# Patient Record
Sex: FEMALE | Race: ASIAN | NOT HISPANIC OR LATINO | Employment: FULL TIME | URBAN - METROPOLITAN AREA
[De-identification: names, ages, dates, MRNs, and addresses within clinical notes are randomized per-mention and may not be internally consistent; named-entity substitution may affect disease eponyms.]

---

## 2023-02-21 LAB
ESTRADIOL (PG/ML) IN SER/PLAS: 118 PG/ML
LUTEINIZING HORMONE (IU/ML) IN SER/PLAS: 5.3 IU/L

## 2023-05-11 LAB — THYROTROPIN (MIU/L) IN SER/PLAS BY DETECTION LIMIT <= 0.05 MIU/L: 3.53 MIU/L (ref 0.44–3.98)

## 2023-05-12 LAB
ABO GROUP (TYPE) IN BLOOD: NORMAL
ANTIBODY SCREEN: NORMAL
CHLAMYDIA TRACH., AMPLIFIED: NEGATIVE
DEHYDROEPIANDROSTERONE SULFATE (DHEA-S) (UG/DL) IN SER/: 513 UG/DL (ref 12–379)
HEPATITIS B VIRUS SURFACE AG PRESENCE IN SERUM: NONREACTIVE
HEPATITIS C VIRUS AB PRESENCE IN SERUM: NONREACTIVE
HIV 1/ 2 AG/AB SCREEN: NONREACTIVE
N. GONORRHEA, AMPLIFIED: NEGATIVE
RH FACTOR: NORMAL
SYPHILIS TOTAL AB: NONREACTIVE

## 2023-06-20 LAB
ESTRADIOL (PG/ML) IN SER/PLAS: <20 PG/ML
HEMATOCRIT (%) IN BLOOD BY AUTOMATED COUNT: 35.8 % (ref 36–46)
THYROTROPIN (MIU/L) IN SER/PLAS BY DETECTION LIMIT <= 0.05 MIU/L: 9.18 MIU/L (ref 0.44–3.98)
THYROXINE (T4) FREE (NG/DL) IN SER/PLAS: 0.74 NG/DL (ref 0.61–1.12)

## 2023-06-26 LAB — ESTRADIOL (PG/ML) IN SER/PLAS: 228 PG/ML

## 2023-06-28 LAB — ESTRADIOL (PG/ML) IN SER/PLAS: 774 PG/ML

## 2023-06-30 LAB — ESTRADIOL (PG/ML) IN SER/PLAS: 1567 PG/ML

## 2023-07-02 LAB
ESTRADIOL (PG/ML) IN SER/PLAS: 3484 PG/ML
PROGESTERONE (NG/ML) IN SER/PLAS: 1.4 NG/ML

## 2023-07-03 LAB
ESTRADIOL (PG/ML) IN SER/PLAS: 4337 PG/ML
PROGESTERONE (NG/ML) IN SER/PLAS: 1.6 NG/ML

## 2023-07-04 LAB
LUTEINIZING HORMONE (IU/ML) IN SER/PLAS: 21.7 IU/L
PROGESTERONE (NG/ML) IN SER/PLAS: 11.8 NG/ML

## 2023-07-20 LAB — THYROTROPIN (MIU/L) IN SER/PLAS BY DETECTION LIMIT <= 0.05 MIU/L: 3.34 MIU/L (ref 0.44–3.98)

## 2023-08-07 LAB
ESTRADIOL (PG/ML) IN SER/PLAS: 418 PG/ML
PROGESTERONE (NG/ML) IN SER/PLAS: 0.4 NG/ML

## 2023-08-15 LAB — PROGESTERONE (NG/ML) IN SER/PLAS: 39.5 NG/ML

## 2023-08-25 LAB
CHORIOGONADOTROPIN (MIU/ML) IN SER/PLAS: 649 MIU/ML
THYROTROPIN (MIU/L) IN SER/PLAS BY DETECTION LIMIT <= 0.05 MIU/L: 4.72 MIU/L (ref 0.44–3.98)
THYROXINE (T4) FREE (NG/DL) IN SER/PLAS: 0.98 NG/DL (ref 0.61–1.12)

## 2023-09-01 LAB
CHORIOGONADOTROPIN (MIU/ML) IN SER/PLAS: ABNORMAL MIU/ML
THYROTROPIN (MIU/L) IN SER/PLAS BY DETECTION LIMIT <= 0.05 MIU/L: 3.45 MIU/L (ref 0.44–3.98)

## 2023-09-22 LAB
ALANINE AMINOTRANSFERASE (SGPT) (U/L) IN SER/PLAS: 14 U/L (ref 7–45)
ALBUMIN (G/DL) IN SER/PLAS: 3.9 G/DL (ref 3.4–5)
ALKALINE PHOSPHATASE (U/L) IN SER/PLAS: 58 U/L (ref 33–110)
ANION GAP IN SER/PLAS: 11 MMOL/L (ref 10–20)
ASPARTATE AMINOTRANSFERASE (SGOT) (U/L) IN SER/PLAS: 17 U/L (ref 9–39)
BILIRUBIN TOTAL (MG/DL) IN SER/PLAS: 0.3 MG/DL (ref 0–1.2)
CALCIUM (MG/DL) IN SER/PLAS: 9.6 MG/DL (ref 8.6–10.3)
CARBON DIOXIDE, TOTAL (MMOL/L) IN SER/PLAS: 26 MMOL/L (ref 21–32)
CHLORIDE (MMOL/L) IN SER/PLAS: 102 MMOL/L (ref 98–107)
CREATININE (MG/DL) IN SER/PLAS: 0.64 MG/DL (ref 0.5–1.05)
ERYTHROCYTE DISTRIBUTION WIDTH (RATIO) BY AUTOMATED COUNT: 12.6 % (ref 11.5–14.5)
ERYTHROCYTE MEAN CORPUSCULAR HEMOGLOBIN CONCENTRATION (G/DL) BY AUTOMATED: 32.6 G/DL (ref 32–36)
ERYTHROCYTE MEAN CORPUSCULAR VOLUME (FL) BY AUTOMATED COUNT: 91 FL (ref 80–100)
ERYTHROCYTES (10*6/UL) IN BLOOD BY AUTOMATED COUNT: 4.33 X10E12/L (ref 4–5.2)
GFR FEMALE: >90 ML/MIN/1.73M2
GLUCOSE (MG/DL) IN SER/PLAS: 91 MG/DL (ref 74–99)
HEMATOCRIT (%) IN BLOOD BY AUTOMATED COUNT: 39.3 % (ref 36–46)
HEMOGLOBIN (G/DL) IN BLOOD: 12.8 G/DL (ref 12–16)
LEUKOCYTES (10*3/UL) IN BLOOD BY AUTOMATED COUNT: 12.3 X10E9/L (ref 4.4–11.3)
NRBC (PER 100 WBCS) BY AUTOMATED COUNT: 0 /100 WBC (ref 0–0)
PLATELETS (10*3/UL) IN BLOOD AUTOMATED COUNT: 270 X10E9/L (ref 150–450)
POTASSIUM (MMOL/L) IN SER/PLAS: 3.8 MMOL/L (ref 3.5–5.3)
PROTEIN TOTAL: 7.3 G/DL (ref 6.4–8.2)
REFLEX ADDED, ANEMIA PANEL: ABNORMAL
SODIUM (MMOL/L) IN SER/PLAS: 135 MMOL/L (ref 136–145)
THYROTROPIN (MIU/L) IN SER/PLAS BY DETECTION LIMIT <= 0.05 MIU/L: 0.81 MIU/L (ref 0.44–3.98)
UREA NITROGEN (MG/DL) IN SER/PLAS: 10 MG/DL (ref 6–23)

## 2023-09-23 LAB
ABO GROUP (TYPE) IN BLOOD: NORMAL
ANTIBODY SCREEN: NORMAL
CALCIDIOL (25 OH VITAMIN D3) (NG/ML) IN SER/PLAS: 61 NG/ML
CHLAMYDIA TRACH., AMPLIFIED: NEGATIVE
ESTIMATED AVERAGE GLUCOSE FOR HBA1C: 100 MG/DL
HEMOGLOBIN A1C/HEMOGLOBIN TOTAL IN BLOOD: 5.1 %
HEPATITIS B VIRUS SURFACE AG PRESENCE IN SERUM: NONREACTIVE
HEPATITIS C VIRUS AB PRESENCE IN SERUM: NONREACTIVE
HIV 1/ 2 AG/AB SCREEN: NONREACTIVE
N. GONORRHEA, AMPLIFIED: NEGATIVE
RH FACTOR: NORMAL
RUBELLA VIRUS IGG AB: POSITIVE
SYPHILIS TOTAL AB: NONREACTIVE
TRICHOMONAS VAGINALIS: NEGATIVE

## 2023-09-24 LAB — URINE CULTURE: NORMAL

## 2023-09-27 LAB
HEMOGLOBIN A2: 2.6 %
HEMOGLOBIN A: 97.1 %
HEMOGLOBIN F: 0.3 %
HEMOGLOBIN IDENTIFICATION INTERPRETATION: NORMAL
PATH REVIEW-HGB IDENTIFICATION: NORMAL

## 2023-10-08 PROBLEM — M25.611 STIFFNESS OF RIGHT SHOULDER JOINT: Status: ACTIVE | Noted: 2023-10-08

## 2023-10-08 PROBLEM — N91.5 OLIGOMENORRHEA: Status: ACTIVE | Noted: 2023-10-08

## 2023-10-08 PROBLEM — M25.511 PAIN OF RIGHT SHOULDER REGION: Status: ACTIVE | Noted: 2023-10-08

## 2023-10-08 PROBLEM — Z86.19 HISTORY OF HEPATITIS B VIRUS INFECTION: Status: ACTIVE | Noted: 2023-10-08

## 2023-10-08 PROBLEM — E28.2 PCOS (POLYCYSTIC OVARIAN SYNDROME): Status: ACTIVE | Noted: 2023-10-08

## 2023-10-08 PROBLEM — R79.89 LOW VITAMIN D LEVEL: Status: ACTIVE | Noted: 2023-10-08

## 2023-10-08 PROBLEM — O36.80X0 ENCOUNTER TO DETERMINE FETAL VIABILITY OF PREGNANCY (HHS-HCC): Status: ACTIVE | Noted: 2023-10-08

## 2023-10-08 PROBLEM — N97.9 FEMALE INFERTILITY: Status: ACTIVE | Noted: 2023-10-08

## 2023-10-08 PROBLEM — E03.8 SUBCLINICAL HYPOTHYROIDISM: Status: ACTIVE | Noted: 2023-10-08

## 2023-10-08 PROBLEM — N92.6 IRREGULAR PERIODS: Status: ACTIVE | Noted: 2023-10-08

## 2023-10-08 PROBLEM — R29.898 WEAKNESS OF SHOULDER: Status: ACTIVE | Noted: 2023-10-08

## 2023-10-08 PROBLEM — F41.9 ANXIETY: Status: ACTIVE | Noted: 2023-10-08

## 2023-10-08 PROBLEM — S43.431D LABRAL TEAR OF SHOULDER, RIGHT, SUBSEQUENT ENCOUNTER: Status: ACTIVE | Noted: 2023-10-08

## 2023-10-08 PROBLEM — R79.89 ELEVATED DEHYDROEPIANDROSTERONE SULFATE LEVEL: Status: ACTIVE | Noted: 2023-10-08

## 2023-10-08 RX ORDER — CHORIOGONADOTROPIN ALFA 250 UG/.5ML
INJECTION, SOLUTION SUBCUTANEOUS
COMMUNITY
Start: 2022-12-07 | End: 2023-10-10 | Stop reason: ALTCHOICE

## 2023-10-08 RX ORDER — FOLLITROPIN 450 [IU]/.75ML
INJECTION, SOLUTION SUBCUTANEOUS
COMMUNITY
Start: 2023-06-07 | End: 2023-10-10 | Stop reason: ALTCHOICE

## 2023-10-08 RX ORDER — GONADOTROPHIN, CHORIONIC 5000 UNIT
KIT INTRAMUSCULAR
COMMUNITY
Start: 2023-06-13 | End: 2023-10-10 | Stop reason: ALTCHOICE

## 2023-10-08 RX ORDER — LEUPROLIDE ACETATE 1 MG/0.2ML
KIT SUBCUTANEOUS
COMMUNITY
Start: 2023-06-07 | End: 2023-10-10 | Stop reason: ALTCHOICE

## 2023-10-08 RX ORDER — CETRORELIX ACETATE 0.25 MG
KIT SUBCUTANEOUS
COMMUNITY
Start: 2023-06-13 | End: 2023-10-10 | Stop reason: ALTCHOICE

## 2023-10-08 RX ORDER — ACETAMINOPHEN 500 MG
1 TABLET ORAL DAILY
COMMUNITY
Start: 2022-02-07

## 2023-10-08 RX ORDER — LETROZOLE 2.5 MG/1
TABLET, FILM COATED ORAL
COMMUNITY
Start: 2022-07-20 | End: 2023-10-10 | Stop reason: ALTCHOICE

## 2023-10-08 RX ORDER — GANIRELIX ACETATE 250 UG/.5ML
INJECTION, SOLUTION SUBCUTANEOUS
COMMUNITY
Start: 2023-06-07 | End: 2023-10-10 | Stop reason: ALTCHOICE

## 2023-10-08 RX ORDER — LEVOTHYROXINE SODIUM 25 UG/1
1 TABLET ORAL DAILY
COMMUNITY
Start: 2023-05-12 | End: 2023-10-10 | Stop reason: ALTCHOICE

## 2023-10-08 RX ORDER — NORGESTIMATE AND ETHINYL ESTRADIOL 0.25-0.035
1 KIT ORAL DAILY
COMMUNITY
Start: 2023-06-05 | End: 2023-10-10 | Stop reason: ALTCHOICE

## 2023-10-08 RX ORDER — LEVOTHYROXINE SODIUM 75 UG/1
1 TABLET ORAL DAILY
COMMUNITY
Start: 2023-06-20 | End: 2023-10-12

## 2023-10-08 RX ORDER — MENOTROPINS 75 UNIT
KIT SUBCUTANEOUS
COMMUNITY
Start: 2023-06-07 | End: 2023-10-10 | Stop reason: ALTCHOICE

## 2023-10-08 RX ORDER — ESTRADIOL 2 MG/1
3 TABLET ORAL DAILY
COMMUNITY
Start: 2023-07-20 | End: 2023-10-10 | Stop reason: ALTCHOICE

## 2023-10-10 ENCOUNTER — ROUTINE PRENATAL (OUTPATIENT)
Dept: OBSTETRICS AND GYNECOLOGY | Facility: CLINIC | Age: 33
End: 2023-10-10
Payer: COMMERCIAL

## 2023-10-10 VITALS
DIASTOLIC BLOOD PRESSURE: 82 MMHG | BODY MASS INDEX: 24.99 KG/M2 | WEIGHT: 154.38 LBS | SYSTOLIC BLOOD PRESSURE: 124 MMHG

## 2023-10-10 DIAGNOSIS — Z3A.11 11 WEEKS GESTATION OF PREGNANCY (HHS-HCC): Primary | ICD-10-CM

## 2023-10-10 DIAGNOSIS — E03.9 HYPOTHYROIDISM, UNSPECIFIED TYPE: ICD-10-CM

## 2023-10-10 PROBLEM — R29.898 WEAKNESS OF SHOULDER: Status: RESOLVED | Noted: 2023-10-08 | Resolved: 2023-10-10

## 2023-10-10 PROBLEM — M25.511 PAIN OF RIGHT SHOULDER REGION: Status: RESOLVED | Noted: 2023-10-08 | Resolved: 2023-10-10

## 2023-10-10 PROBLEM — M25.611 STIFFNESS OF RIGHT SHOULDER JOINT: Status: RESOLVED | Noted: 2023-10-08 | Resolved: 2023-10-10

## 2023-10-10 PROBLEM — R79.89 LOW VITAMIN D LEVEL: Status: RESOLVED | Noted: 2023-10-08 | Resolved: 2023-10-10

## 2023-10-10 PROBLEM — R79.89 ELEVATED DEHYDROEPIANDROSTERONE SULFATE LEVEL: Status: RESOLVED | Noted: 2023-10-08 | Resolved: 2023-10-10

## 2023-10-10 PROBLEM — S43.431D LABRAL TEAR OF SHOULDER, RIGHT, SUBSEQUENT ENCOUNTER: Status: RESOLVED | Noted: 2023-10-08 | Resolved: 2023-10-10

## 2023-10-10 PROBLEM — E28.2 PCOS (POLYCYSTIC OVARIAN SYNDROME): Status: RESOLVED | Noted: 2023-10-08 | Resolved: 2023-10-10

## 2023-10-10 PROBLEM — N92.6 IRREGULAR PERIODS: Status: RESOLVED | Noted: 2023-10-08 | Resolved: 2023-10-10

## 2023-10-10 PROBLEM — N91.5 OLIGOMENORRHEA: Status: RESOLVED | Noted: 2023-10-08 | Resolved: 2023-10-10

## 2023-10-10 PROBLEM — Z34.01 ENCOUNTER FOR SUPERVISION OF NORMAL FIRST PREGNANCY IN FIRST TRIMESTER (HHS-HCC): Status: ACTIVE | Noted: 2023-10-10

## 2023-10-10 PROBLEM — F41.9 ANXIETY: Status: RESOLVED | Noted: 2023-10-08 | Resolved: 2023-10-10

## 2023-10-10 PROBLEM — O36.80X0 ENCOUNTER TO DETERMINE FETAL VIABILITY OF PREGNANCY (HHS-HCC): Status: RESOLVED | Noted: 2023-10-08 | Resolved: 2023-10-10

## 2023-10-10 PROCEDURE — 36415 COLL VENOUS BLD VENIPUNCTURE: CPT

## 2023-10-10 PROCEDURE — 0501F PRENATAL FLOW SHEET: CPT | Performed by: OBSTETRICS & GYNECOLOGY

## 2023-10-10 PROCEDURE — 84443 ASSAY THYROID STIM HORMONE: CPT

## 2023-10-10 RX ORDER — ONDANSETRON 4 MG/1
4 TABLET, FILM COATED ORAL EVERY 6 HOURS PRN
COMMUNITY
Start: 2023-09-22 | End: 2024-01-05 | Stop reason: WASHOUT

## 2023-10-10 NOTE — PROGRESS NOTES
Routine ob at 11.2 wks by IVF dates.  Feeling well, starting to feel better.  Will increase her ASA from 81 mg to 162 mg at 12 wks.  S/p flu vaccine and new ob visit, will get COVID booster at pharmacy.  NIPS and repeat TSH drawn today.  Has NT scan scheduled.  RTC 4 wks.

## 2023-10-11 LAB — TSH SERPL-ACNC: 0.3 MIU/L (ref 0.44–3.98)

## 2023-10-12 DIAGNOSIS — E03.9 HYPOTHYROIDISM AFFECTING PREGNANCY IN FIRST TRIMESTER (HHS-HCC): Primary | ICD-10-CM

## 2023-10-12 DIAGNOSIS — O99.281 HYPOTHYROIDISM AFFECTING PREGNANCY IN FIRST TRIMESTER (HHS-HCC): Primary | ICD-10-CM

## 2023-10-12 RX ORDER — LEVOTHYROXINE SODIUM 50 UG/1
50 TABLET ORAL
Qty: 30 TABLET | Refills: 11 | Status: SHIPPED | OUTPATIENT
Start: 2023-10-12 | End: 2024-10-11

## 2023-10-13 ENCOUNTER — TELEPHONE (OUTPATIENT)
Dept: OBSTETRICS AND GYNECOLOGY | Facility: CLINIC | Age: 33
End: 2023-10-13
Payer: COMMERCIAL

## 2023-10-19 ENCOUNTER — APPOINTMENT (OUTPATIENT)
Dept: OBSTETRICS AND GYNECOLOGY | Facility: CLINIC | Age: 33
End: 2023-10-19
Payer: COMMERCIAL

## 2023-10-23 ENCOUNTER — PATIENT MESSAGE (OUTPATIENT)
Dept: OBSTETRICS AND GYNECOLOGY | Facility: CLINIC | Age: 33
End: 2023-10-23
Payer: COMMERCIAL

## 2023-10-23 ENCOUNTER — ANCILLARY PROCEDURE (OUTPATIENT)
Dept: RADIOLOGY | Facility: CLINIC | Age: 33
End: 2023-10-23
Payer: COMMERCIAL

## 2023-10-23 DIAGNOSIS — Z34.90 ENCOUNTER FOR SUPERVISION OF NORMAL PREGNANCY, UNSPECIFIED, UNSPECIFIED TRIMESTER (HHS-HCC): ICD-10-CM

## 2023-10-23 DIAGNOSIS — O09.01: ICD-10-CM

## 2023-10-23 PROCEDURE — 76816 OB US FOLLOW-UP PER FETUS: CPT

## 2023-10-23 PROCEDURE — 76813 OB US NUCHAL MEAS 1 GEST: CPT

## 2023-10-23 PROCEDURE — 76813 OB US NUCHAL MEAS 1 GEST: CPT | Performed by: OBSTETRICS & GYNECOLOGY

## 2023-11-06 ENCOUNTER — APPOINTMENT (OUTPATIENT)
Dept: OBSTETRICS AND GYNECOLOGY | Facility: CLINIC | Age: 33
End: 2023-11-06
Payer: COMMERCIAL

## 2023-11-07 ENCOUNTER — ROUTINE PRENATAL (OUTPATIENT)
Dept: OBSTETRICS AND GYNECOLOGY | Facility: CLINIC | Age: 33
End: 2023-11-07
Payer: COMMERCIAL

## 2023-11-07 VITALS — WEIGHT: 151.5 LBS | DIASTOLIC BLOOD PRESSURE: 78 MMHG | SYSTOLIC BLOOD PRESSURE: 118 MMHG | BODY MASS INDEX: 24.52 KG/M2

## 2023-11-07 DIAGNOSIS — E03.9 HYPOTHYROIDISM, UNSPECIFIED TYPE: Primary | ICD-10-CM

## 2023-11-07 DIAGNOSIS — Z3A.14 14 WEEKS GESTATION OF PREGNANCY (HHS-HCC): ICD-10-CM

## 2023-11-07 PROCEDURE — 0501F PRENATAL FLOW SHEET: CPT | Performed by: OBSTETRICS & GYNECOLOGY

## 2023-11-07 NOTE — PROGRESS NOTES
No complaints.  14w5d IVF preg  Wants to meet all the docs in group.    Thyroid med dose recently adjusted lower. Recheck TSH.    OB labs reviewed w her in detail. All wnl. NIPT wnl  XY . NT scan wnl.     Has linda US sched.  Noted minimal wt gain, ? Loss 3 lb on different office scale .  pt will try increase calories.    Answered her questions on midwifery care and  care.  She will look into this more.     Follow up in 4 weeks   Makenna Parkinson MD

## 2023-11-14 ENCOUNTER — LAB (OUTPATIENT)
Dept: LAB | Facility: LAB | Age: 33
End: 2023-11-14
Payer: COMMERCIAL

## 2023-11-14 DIAGNOSIS — E03.9 HYPOTHYROIDISM, UNSPECIFIED TYPE: ICD-10-CM

## 2023-11-14 LAB — TSH SERPL-ACNC: 0.61 MIU/L (ref 0.44–3.98)

## 2023-11-14 PROCEDURE — 84443 ASSAY THYROID STIM HORMONE: CPT

## 2023-11-14 PROCEDURE — 36415 COLL VENOUS BLD VENIPUNCTURE: CPT

## 2023-12-05 ENCOUNTER — ROUTINE PRENATAL (OUTPATIENT)
Dept: OBSTETRICS AND GYNECOLOGY | Facility: CLINIC | Age: 33
End: 2023-12-05
Payer: COMMERCIAL

## 2023-12-05 VITALS
WEIGHT: 158.13 LBS | SYSTOLIC BLOOD PRESSURE: 118 MMHG | DIASTOLIC BLOOD PRESSURE: 78 MMHG | BODY MASS INDEX: 25.59 KG/M2

## 2023-12-05 DIAGNOSIS — E03.8 SUBCLINICAL HYPOTHYROIDISM: Primary | ICD-10-CM

## 2023-12-05 DIAGNOSIS — Z34.01 ENCOUNTER FOR SUPERVISION OF NORMAL FIRST PREGNANCY IN FIRST TRIMESTER (HHS-HCC): ICD-10-CM

## 2023-12-05 DIAGNOSIS — Z86.19 HISTORY OF HEPATITIS B VIRUS INFECTION: ICD-10-CM

## 2023-12-05 DIAGNOSIS — Z3A.18 18 WEEKS GESTATION OF PREGNANCY (HHS-HCC): ICD-10-CM

## 2023-12-05 PROBLEM — Z3A.11 11 WEEKS GESTATION OF PREGNANCY (HHS-HCC): Status: RESOLVED | Noted: 2023-10-10 | Resolved: 2023-12-05

## 2023-12-05 PROCEDURE — 0501F PRENATAL FLOW SHEET: CPT | Performed by: STUDENT IN AN ORGANIZED HEALTH CARE EDUCATION/TRAINING PROGRAM

## 2023-12-05 NOTE — PROGRESS NOTES
Subjective   Patient ID [id number]   Marisabel Segundo is a 33 y.o.  at 18w5d with a working estimated date of delivery of 2024, by Embryo Transfer who presents for a routine prenatal visit. Nausea resolved. Some cramping, no bleeding. Having some insomnia. Occasional cramps in calves.     Objective   Physical Exam  Vitals:    23 0832   BP: 118/78      Weight: 71.7 kg (158 lb 2 oz), Pregravid BMI: 24.60  Expected Total Weight Gain: 11.5 kg (25 lb)-16 kg (35 lb)   Fundal height and FHR per prenatal flowsheet    Assessment/Plan     Marisabel Segundo is a 33 y.o.  at 18w5d with a working estimated date of delivery of 2024, by Embryo Transfer who presents for a routine prenatal visit.    Reminded patient to get COVID booster, planning. Anatomy scheduled this week. Taking ASA and synthroid. To recheck TSH with second trimester labs. Encouraged GI follow-up for hepatitis B check-in. Reviewed growth scans for IVF. Remainder of plan per problem list as below.     Problem List Items Addressed This Visit       Encounter for supervision of normal first pregnancy in first trimester    Overview     -ASA at 12 wks  -s/p risk-reducing cfDNA and normal NT  -Rh positive, rubella immune  -s/p flu shot for this year 2023    Next Visit  [ ] follow-up COVID (planning)  [ ] GI follow-up         History of hepatitis B virus infection    Overview     -Last saw GI 2022, suspect cleared infection  -LFTs and surface Ag WNL this pregnancy         Subclinical hypothyroidism - Primary    Overview     -TSH 0.3 (10/10) -> synthroid decreased from 75mcg to 50mcg -> TSH 0.61 ()  [ ] Repeat TSH with second trimester labs          Other Visit Diagnoses       18 weeks gestation of pregnancy              Follow up in 4 weeks for a routine prenatal visit.    Lottie Mcgrath MD

## 2023-12-07 ENCOUNTER — ANCILLARY PROCEDURE (OUTPATIENT)
Dept: RADIOLOGY | Facility: CLINIC | Age: 33
End: 2023-12-07
Payer: COMMERCIAL

## 2023-12-07 DIAGNOSIS — Z34.90 ENCOUNTER FOR SUPERVISION OF NORMAL PREGNANCY, UNSPECIFIED, UNSPECIFIED TRIMESTER (HHS-HCC): ICD-10-CM

## 2023-12-07 PROCEDURE — 76811 OB US DETAILED SNGL FETUS: CPT | Performed by: OBSTETRICS & GYNECOLOGY

## 2023-12-07 PROCEDURE — 76811 OB US DETAILED SNGL FETUS: CPT

## 2023-12-21 ENCOUNTER — ANCILLARY PROCEDURE (OUTPATIENT)
Dept: RADIOLOGY | Facility: CLINIC | Age: 33
End: 2023-12-21
Payer: COMMERCIAL

## 2023-12-21 DIAGNOSIS — Z34.90 ENCOUNTER FOR SUPERVISION OF NORMAL PREGNANCY, UNSPECIFIED, UNSPECIFIED TRIMESTER (HHS-HCC): ICD-10-CM

## 2023-12-21 PROCEDURE — 76816 OB US FOLLOW-UP PER FETUS: CPT

## 2023-12-21 PROCEDURE — 76816 OB US FOLLOW-UP PER FETUS: CPT | Performed by: OBSTETRICS & GYNECOLOGY

## 2023-12-29 ASSESSMENT — PROMIS GLOBAL HEALTH SCALE
RATE_GENERAL_HEALTH: VERY GOOD
CARRYOUT_PHYSICAL_ACTIVITIES: COMPLETELY
RATE_MENTAL_HEALTH: VERY GOOD
RATE_SOCIAL_SATISFACTION: VERY GOOD
RATE_AVERAGE_PAIN: 4
EMOTIONAL_PROBLEMS: RARELY
RATE_AVERAGE_FATIGUE: MODERATE
CARRYOUT_SOCIAL_ACTIVITIES: VERY GOOD
RATE_PHYSICAL_HEALTH: GOOD
RATE_QUALITY_OF_LIFE: VERY GOOD

## 2024-01-05 ENCOUNTER — LAB (OUTPATIENT)
Dept: LAB | Facility: LAB | Age: 34
End: 2024-01-05
Payer: COMMERCIAL

## 2024-01-05 ENCOUNTER — ROUTINE PRENATAL (OUTPATIENT)
Dept: OBSTETRICS AND GYNECOLOGY | Facility: CLINIC | Age: 34
End: 2024-01-05
Payer: COMMERCIAL

## 2024-01-05 ENCOUNTER — OFFICE VISIT (OUTPATIENT)
Dept: PRIMARY CARE | Facility: CLINIC | Age: 34
End: 2024-01-05
Payer: COMMERCIAL

## 2024-01-05 VITALS
DIASTOLIC BLOOD PRESSURE: 60 MMHG | OXYGEN SATURATION: 98 % | BODY MASS INDEX: 26.36 KG/M2 | HEIGHT: 66 IN | WEIGHT: 164 LBS | SYSTOLIC BLOOD PRESSURE: 100 MMHG | HEART RATE: 60 BPM | RESPIRATION RATE: 14 BRPM | TEMPERATURE: 97.6 F

## 2024-01-05 VITALS
WEIGHT: 166.38 LBS | BODY MASS INDEX: 26.93 KG/M2 | DIASTOLIC BLOOD PRESSURE: 70 MMHG | SYSTOLIC BLOOD PRESSURE: 108 MMHG

## 2024-01-05 DIAGNOSIS — Z86.19 HISTORY OF HEPATITIS B VIRUS INFECTION: ICD-10-CM

## 2024-01-05 DIAGNOSIS — Z00.00 PERIODIC HEALTH ASSESSMENT, GENERAL SCREENING, ADULT: ICD-10-CM

## 2024-01-05 DIAGNOSIS — M25.511 CHRONIC RIGHT SHOULDER PAIN: ICD-10-CM

## 2024-01-05 DIAGNOSIS — G89.29 CHRONIC RIGHT SHOULDER PAIN: ICD-10-CM

## 2024-01-05 DIAGNOSIS — E03.8 SUBCLINICAL HYPOTHYROIDISM: Primary | ICD-10-CM

## 2024-01-05 DIAGNOSIS — B18.1 CHRONIC HEPATITIS B VIRUS INFECTION (MULTI): ICD-10-CM

## 2024-01-05 DIAGNOSIS — Z34.01 ENCOUNTER FOR SUPERVISION OF NORMAL FIRST PREGNANCY IN FIRST TRIMESTER (HHS-HCC): ICD-10-CM

## 2024-01-05 DIAGNOSIS — Z3A.23 23 WEEKS GESTATION OF PREGNANCY (HHS-HCC): ICD-10-CM

## 2024-01-05 DIAGNOSIS — Z00.00 PERIODIC HEALTH ASSESSMENT, GENERAL SCREENING, ADULT: Primary | ICD-10-CM

## 2024-01-05 DIAGNOSIS — E03.9 ACQUIRED HYPOTHYROIDISM: ICD-10-CM

## 2024-01-05 PROBLEM — R87.610 ASCUS OF CERVIX WITH NEGATIVE HIGH RISK HPV: Status: ACTIVE | Noted: 2024-01-05

## 2024-01-05 LAB
ALBUMIN SERPL BCP-MCNC: 3.5 G/DL (ref 3.4–5)
ALP SERPL-CCNC: 54 U/L (ref 33–110)
ALT SERPL W P-5'-P-CCNC: 13 U/L (ref 7–45)
ANION GAP SERPL CALC-SCNC: 12 MMOL/L (ref 10–20)
AST SERPL W P-5'-P-CCNC: 14 U/L (ref 9–39)
BILIRUB SERPL-MCNC: 0.3 MG/DL (ref 0–1.2)
BUN SERPL-MCNC: 5 MG/DL (ref 6–23)
CALCIUM SERPL-MCNC: 8.9 MG/DL (ref 8.6–10.3)
CHLORIDE SERPL-SCNC: 104 MMOL/L (ref 98–107)
CHOLEST SERPL-MCNC: 195 MG/DL (ref 0–199)
CHOLESTEROL/HDL RATIO: 3.1
CO2 SERPL-SCNC: 25 MMOL/L (ref 21–32)
CREAT SERPL-MCNC: 0.5 MG/DL (ref 0.5–1.05)
ERYTHROCYTE [DISTWIDTH] IN BLOOD BY AUTOMATED COUNT: 13.7 % (ref 11.5–14.5)
GFR SERPL CREATININE-BSD FRML MDRD: >90 ML/MIN/1.73M*2
GLUCOSE SERPL-MCNC: 109 MG/DL (ref 74–99)
HBV CORE AB SER QL: REACTIVE
HBV SURFACE AB SER-ACNC: 5.3 MIU/ML
HBV SURFACE AG SERPL QL IA: NONREACTIVE
HCT VFR BLD AUTO: 36.5 % (ref 36–46)
HDLC SERPL-MCNC: 63.7 MG/DL
HGB BLD-MCNC: 12 G/DL (ref 12–16)
LDLC SERPL CALC-MCNC: 65 MG/DL
MCH RBC QN AUTO: 30.6 PG (ref 26–34)
MCHC RBC AUTO-ENTMCNC: 32.9 G/DL (ref 32–36)
MCV RBC AUTO: 93 FL (ref 80–100)
NON HDL CHOLESTEROL: 131 MG/DL (ref 0–149)
NRBC BLD-RTO: 0 /100 WBCS (ref 0–0)
PLATELET # BLD AUTO: 207 X10*3/UL (ref 150–450)
POTASSIUM SERPL-SCNC: 3.5 MMOL/L (ref 3.5–5.3)
PROT SERPL-MCNC: 6.3 G/DL (ref 6.4–8.2)
RBC # BLD AUTO: 3.92 X10*6/UL (ref 4–5.2)
SODIUM SERPL-SCNC: 137 MMOL/L (ref 136–145)
TRIGL SERPL-MCNC: 332 MG/DL (ref 0–149)
TSH SERPL-ACNC: 1.01 MIU/L (ref 0.44–3.98)
VLDL: 66 MG/DL (ref 0–40)
WBC # BLD AUTO: 12.8 X10*3/UL (ref 4.4–11.3)

## 2024-01-05 PROCEDURE — 84443 ASSAY THYROID STIM HORMONE: CPT

## 2024-01-05 PROCEDURE — 36415 COLL VENOUS BLD VENIPUNCTURE: CPT

## 2024-01-05 PROCEDURE — 99395 PREV VISIT EST AGE 18-39: CPT | Performed by: INTERNAL MEDICINE

## 2024-01-05 PROCEDURE — 86704 HEP B CORE ANTIBODY TOTAL: CPT

## 2024-01-05 PROCEDURE — 0501F PRENATAL FLOW SHEET: CPT | Performed by: STUDENT IN AN ORGANIZED HEALTH CARE EDUCATION/TRAINING PROGRAM

## 2024-01-05 PROCEDURE — 87340 HEPATITIS B SURFACE AG IA: CPT

## 2024-01-05 PROCEDURE — 80061 LIPID PANEL: CPT

## 2024-01-05 PROCEDURE — 87517 HEPATITIS B DNA QUANT: CPT

## 2024-01-05 PROCEDURE — 86706 HEP B SURFACE ANTIBODY: CPT

## 2024-01-05 PROCEDURE — 85027 COMPLETE CBC AUTOMATED: CPT

## 2024-01-05 PROCEDURE — 1036F TOBACCO NON-USER: CPT | Performed by: INTERNAL MEDICINE

## 2024-01-05 PROCEDURE — 80053 COMPREHEN METABOLIC PANEL: CPT

## 2024-01-05 RX ORDER — ASPIRIN 81 MG/1
162 TABLET ORAL DAILY
COMMUNITY
End: 2024-05-04 | Stop reason: HOSPADM

## 2024-01-05 ASSESSMENT — ENCOUNTER SYMPTOMS
COUGH: 0
NAUSEA: 0
PALPITATIONS: 0
ABDOMINAL PAIN: 0
CONSTIPATION: 0
WHEEZING: 0
SHORTNESS OF BREATH: 0
DIARRHEA: 0

## 2024-01-05 NOTE — PROGRESS NOTES
"Subjective   Patient ID: Marisabel Segundo is a 34 y.o. female who presents for Annual Exam.    Overall doing well.  She is pregnant with her first child. Patient is fairly active.  Denies any issues with CP,SOB or dizzy spells.  No issues with anxiety, depression or sleep related problems. Denies any issues with HA, numbness or tingling.  No issues or changes with bowel or bladder habits.       Review of Systems   Respiratory:  Negative for cough, shortness of breath and wheezing.    Cardiovascular:  Negative for chest pain and palpitations.   Gastrointestinal:  Negative for abdominal pain, constipation, diarrhea and nausea.   ROS is otherwise unremarkable.      Objective   /60 (BP Location: Left arm, Patient Position: Sitting, BP Cuff Size: Adult)   Pulse 60   Temp 36.4 °C (97.6 °F) (Tympanic)   Resp 14   Ht 1.676 m (5' 6\")   Wt 74.4 kg (164 lb)   LMP 06/23/2023   SpO2 98%   BMI 26.47 kg/m²     Physical Exam  Vitals reviewed.   Constitutional:       Appearance: Normal appearance.   HENT:      Head: Normocephalic.   Cardiovascular:      Rate and Rhythm: Normal rate.   Pulmonary:      Effort: Pulmonary effort is normal.   Musculoskeletal:         General: Normal range of motion.   Neurological:      General: No focal deficit present.      Mental Status: She is alert.   Psychiatric:         Mood and Affect: Mood normal.         Assessment/Plan   Problem List Items Addressed This Visit             ICD-10-CM    Chronic hepatitis B virus infection (CMS/HCC) B18.1     Other Visit Diagnoses         Codes    Periodic health assessment, general screening, adult    -  Primary Z00.00    Relevant Orders    Comprehensive Metabolic Panel (Completed)    CBC (Completed)    Hepatitis B surface antibody (Completed)    Hepatitis B surface antigen (Completed)    Hepatitis B core antibody, total (Completed)    Hepatitis B DNA, Ultraquantitative, PCR    Thyroid Stimulating Hormone (Completed)    Referral to Physical Therapy    " Lipid Panel (Completed)    Acquired hypothyroidism     E03.9    Chronic right shoulder pain     M25.511, G89.29        Physical exam is unremarkable.  We reviewed and discussed all the above.  We discussed current medications as well as most recent test results.  We discussed the importance and benefits of a healthy diet that is both low in sugars and low in saturated fats.  We reviewed and discussed the benefits of regular physical exercise especially when at or above a level of 150 minutes/week.  We also discussed the importance of stress management and good sleep hygiene.  We will continue to work on lifestyle improvements and follow-up in 6 to 12 months, sooner if any issues should arise.

## 2024-01-05 NOTE — PROGRESS NOTES
Subjective   Patient ID [id number]   Marisabel Segundo is a 34 y.o.  at 23w1d with a working estimated date of delivery of 2024, by Embryo Transfer who presents for a routine prenatal visit. Good FM, no OB complaints. Still having insomnia, no trouble falling asleep but trouble staying asleep. Working with , brought birth plan.    Objective   Physical Exam  Vitals:    24 0837   BP: 108/70      Weight: 75.5 kg (166 lb 6 oz), Pregravid BMI: 24.60  Expected Total Weight Gain: 11.5 kg (25 lb)-16 kg (35 lb)   Fundal height and FHR per prenatal flowsheet    Assessment/Plan     Marisabel Segundo is a 34 y.o.  at 23w1d with a working estimated date of delivery of 2024, by Embryo Transfer who presents for a routine prenatal visit.    Reviewed follow-up anatomy scan, completed and normal. Glucola supplies provided, will recheck TSH with second tri labs next visit. Discussed tdap. Has GI follow-up scheduled this month. Reviewed birth plan, discussed indications for continuous monitoring. Discussed sleep hygiene, okay for melatonin as needed. Remainder of plan per problem list as below.     Problem List Items Addressed This Visit       Encounter for supervision of normal first pregnancy in first trimester    Overview     -ASA at 12 wks  -s/p risk-reducing cfDNA, normal NT, and normal anatomy  -Rh positive, rubella immune  -s/p flu shot for this year 2023  -Counseled on COVID vaccination    Next Visit  [ ] Glucola supplies  [ ] Discuss tdap  [ ] Review anatomy (follow-up for heart views WNL)         History of hepatitis B virus infection    Overview     -Last saw GI 2022, suspect cleared infection  -LFTs and surface Ag WNL this pregnancy  -Next appointment with Dr. Santiago 24         Subclinical hypothyroidism - Primary    Overview     -TSH 0.3 (10/10) -> synthroid decreased from 75mcg to 50mcg -> TSH 0.61 ()  [ ] Repeat TSH with second trimester labs          30 minutes were spent in the care of this  patient. 5 minutes reviewing records, 20 minutes face to face, and 5 minutes charting.     Follow up in 4 weeks for a routine prenatal visit.    Lottie Mcgrath MD

## 2024-01-11 LAB
HBV DNA SERPL NAA+PROBE-ACNC: NOT DETECTED [IU]/ML
HBV DNA SERPL NAA+PROBE-LOG IU: NORMAL {LOG_IU}/ML

## 2024-01-17 ENCOUNTER — TELEMEDICINE (OUTPATIENT)
Dept: GASTROENTEROLOGY | Facility: HOSPITAL | Age: 34
End: 2024-01-17
Payer: COMMERCIAL

## 2024-01-17 DIAGNOSIS — B19.10 HEPATITIS B AFFECTING PREGNANCY (HHS-HCC): Primary | ICD-10-CM

## 2024-01-17 DIAGNOSIS — O98.419 HEPATITIS B AFFECTING PREGNANCY (HHS-HCC): Primary | ICD-10-CM

## 2024-01-17 PROCEDURE — 99213 OFFICE O/P EST LOW 20 MIN: CPT | Performed by: INTERNAL MEDICINE

## 2024-01-17 NOTE — PROGRESS NOTES
Subjective   Patient ID: Marisabel Segundo is a 34 y.o. female who presents for No chief complaint on file..  HPIHepatitis B now with HBsAg negative HBSAb positive at low titers.   Normal liver tests and HBV DNA negative.    Feels well.   24 weeks pregnant after in vitro.      Review of Systems Feels well.      Objective   Physical Exam  Alert nad.   No jaundice.    Assessment/Plan   HBV inactive with negative surface antigen and DNA with pregnancy.    Would check monthly HBV DNA during 3ed trimester (Feb and March).   Will likely remain negative.    If viral load goes over 10,000 suggest tenofovir but this is very unlikely .    Follow up with her primary care doctor.            Kiran Santiago MD 01/17/24 8:17 AM

## 2024-02-08 ENCOUNTER — TELEPHONE (OUTPATIENT)
Dept: OBSTETRICS AND GYNECOLOGY | Facility: CLINIC | Age: 34
End: 2024-02-08

## 2024-02-08 ENCOUNTER — ROUTINE PRENATAL (OUTPATIENT)
Dept: OBSTETRICS AND GYNECOLOGY | Facility: CLINIC | Age: 34
End: 2024-02-08
Payer: COMMERCIAL

## 2024-02-08 VITALS
DIASTOLIC BLOOD PRESSURE: 70 MMHG | WEIGHT: 173.38 LBS | BODY MASS INDEX: 27.98 KG/M2 | SYSTOLIC BLOOD PRESSURE: 120 MMHG

## 2024-02-08 DIAGNOSIS — Z86.19 HISTORY OF HEPATITIS B VIRUS INFECTION: ICD-10-CM

## 2024-02-08 DIAGNOSIS — E03.8 SUBCLINICAL HYPOTHYROIDISM: ICD-10-CM

## 2024-02-08 DIAGNOSIS — R73.09 ELEVATED GLUCOSE: ICD-10-CM

## 2024-02-08 DIAGNOSIS — Z13.1 SCREENING FOR DIABETES MELLITUS: ICD-10-CM

## 2024-02-08 DIAGNOSIS — Z3A.28 28 WEEKS GESTATION OF PREGNANCY (HHS-HCC): ICD-10-CM

## 2024-02-08 DIAGNOSIS — N97.9 FEMALE INFERTILITY: ICD-10-CM

## 2024-02-08 DIAGNOSIS — Z23 NEED FOR VACCINATION: Primary | ICD-10-CM

## 2024-02-08 DIAGNOSIS — Z34.01 ENCOUNTER FOR SUPERVISION OF NORMAL FIRST PREGNANCY IN FIRST TRIMESTER (HHS-HCC): ICD-10-CM

## 2024-02-08 PROBLEM — B18.1 CHRONIC HEPATITIS B VIRUS INFECTION (MULTI): Status: RESOLVED | Noted: 2024-01-05 | Resolved: 2024-02-08

## 2024-02-08 LAB
ERYTHROCYTE [DISTWIDTH] IN BLOOD BY AUTOMATED COUNT: 13.5 % (ref 11.5–14.5)
GLUCOSE 1H P 50 G GLC PO SERPL-MCNC: 179 MG/DL
HCT VFR BLD AUTO: 34.7 % (ref 36–46)
HGB BLD-MCNC: 11.4 G/DL (ref 12–16)
MCH RBC QN AUTO: 31 PG (ref 26–34)
MCHC RBC AUTO-ENTMCNC: 32.9 G/DL (ref 32–36)
MCV RBC AUTO: 94 FL (ref 80–100)
NRBC BLD-RTO: 0 /100 WBCS (ref 0–0)
PLATELET # BLD AUTO: 214 X10*3/UL (ref 150–450)
RBC # BLD AUTO: 3.68 X10*6/UL (ref 4–5.2)
REFLEX ADDED, ANEMIA PANEL: NORMAL
WBC # BLD AUTO: 10.9 X10*3/UL (ref 4.4–11.3)

## 2024-02-08 PROCEDURE — 85027 COMPLETE CBC AUTOMATED: CPT

## 2024-02-08 PROCEDURE — 0501F PRENATAL FLOW SHEET: CPT | Performed by: STUDENT IN AN ORGANIZED HEALTH CARE EDUCATION/TRAINING PROGRAM

## 2024-02-08 PROCEDURE — 82947 ASSAY GLUCOSE BLOOD QUANT: CPT

## 2024-02-08 PROCEDURE — 90715 TDAP VACCINE 7 YRS/> IM: CPT | Performed by: STUDENT IN AN ORGANIZED HEALTH CARE EDUCATION/TRAINING PROGRAM

## 2024-02-08 PROCEDURE — 36415 COLL VENOUS BLD VENIPUNCTURE: CPT

## 2024-02-08 PROCEDURE — 90471 IMMUNIZATION ADMIN: CPT | Performed by: STUDENT IN AN ORGANIZED HEALTH CARE EDUCATION/TRAINING PROGRAM

## 2024-02-08 NOTE — TELEPHONE ENCOUNTER
----- Message from Lottie Mcgrath MD sent at 2/8/2024  2:29 PM EST -----  Failed 1hr, needs 3hr which I ordered. No anemia. Please let patient know!

## 2024-02-08 NOTE — PROGRESS NOTES
Subjective   Patient ID [id number]   Marisabel Segundo is a 34 y.o.  at 28w0d with a working estimated date of delivery of 2024, by Embryo Transfer who presents for a routine prenatal visit. Good FM, no OB complaints. Having some trouble staying asleep. Fell and twisted her ankle while in [place], no abdominal trauma.    Objective   Physical Exam  Vitals:    24 0831   BP: 120/70      Weight: 78.6 kg (173 lb 6 oz), Pregravid BMI: 24.55  Expected Total Weight Gain: 11.5 kg (25 lb)-16 kg (35 lb)   Fundal height and FHR per prenatal flowsheet    Assessment/Plan     Marisabel Segundo is a 34 y.o.  at 28w0d with a working estimated date of delivery of 2024, by Embryo Transfer who presents for a routine prenatal visit.    Second trimester labs and tdap today. TSH last month within range, to repeat at 36wga. Reviewed GI recs, suspected cleared hep B infection. To repeat viral load in third trimester. No change in delivery planning or labor management. Previously discussed with peds and peds ID, infant does NOT require HBIG postpartum. Remainder of plan per problem list as below.     Problem List Items Addressed This Visit       Encounter for supervision of normal first pregnancy in first trimester    Overview     -ASA at 12 wks  -s/p risk-reducing cfDNA, normal NT, and normal anatomy  -Rh positive, rubella immune  -s/p flu shot for this year 2023  -s/p COVID vaccine this year  [ ] TSH at 36wga  [ ] Growth at 30 and 36wga (next scheduled )  [ ] NSTs at 36wga  [ ] Delivery in 39th week    Next Visit  [ ] Discuss IOL - methods, ARRIVE trial  [ ] Review 30 week growth         Female infertility    Overview     IVF pregnancy: plan serial growth, weekly NSTs at 36 wks and delivery 39-39.6         History of hepatitis B virus infection    Overview     -Suspect HBV inactive (prior infection, now cleared) based on positive Ab to core with neg surface Ag and negative viral load  -Follows with Dr. Santiago (GI), last visit  1/17/24  -For repeat viral load in Feb and March -> if above 10k, consider tenofovir   -Per peds and peds ID, infant does NOT require HBIG postpartum  -No change in delivery plan or labor management         Subclinical hypothyroidism    Overview     -TSH 0.3 (10/10) -> synthroid decreased from 75mcg to 50mcg -> TSH 0.61 (11/14)  -Second trimester TSH 1.01  [ ] Repeat TSH in third trimester          Other Visit Diagnoses       Need for vaccination    -  Primary    Relevant Orders    Tdap vaccine, age 7 years and older  (BOOSTRIX) (Completed)    Screening for diabetes mellitus        Relevant Orders    Glucose, 1 Hour Screen, Pregnancy    CBC Anemia Panel With Reflex, Pregnancy    28 weeks gestation of pregnancy              Follow up in 2 weeks for a routine prenatal visit.    Lottie Mcgrath MD

## 2024-02-16 NOTE — PROGRESS NOTES
Marisabel Segundo is a 34 y.o. at 29w0d presents for routine prenatal check.  Pt complains of     There were no vitals filed for this visit.   There is no height or weight on file to calculate BMI.     Plan:  Pt is to F/U in 2wks  Failed 1 hr glucola    Daja Arenas, DO

## 2024-02-22 ENCOUNTER — HOSPITAL ENCOUNTER (OUTPATIENT)
Dept: RADIOLOGY | Facility: CLINIC | Age: 34
Discharge: HOME | End: 2024-02-22
Payer: COMMERCIAL

## 2024-02-22 ENCOUNTER — APPOINTMENT (OUTPATIENT)
Dept: OBSTETRICS AND GYNECOLOGY | Facility: CLINIC | Age: 34
End: 2024-02-22
Payer: COMMERCIAL

## 2024-02-22 DIAGNOSIS — Z34.90 ENCOUNTER FOR SUPERVISION OF NORMAL PREGNANCY, UNSPECIFIED, UNSPECIFIED TRIMESTER (HHS-HCC): ICD-10-CM

## 2024-02-22 PROCEDURE — 76816 OB US FOLLOW-UP PER FETUS: CPT

## 2024-02-22 PROCEDURE — 76819 FETAL BIOPHYS PROFIL W/O NST: CPT | Performed by: STUDENT IN AN ORGANIZED HEALTH CARE EDUCATION/TRAINING PROGRAM

## 2024-02-22 PROCEDURE — 76819 FETAL BIOPHYS PROFIL W/O NST: CPT

## 2024-02-22 PROCEDURE — 76816 OB US FOLLOW-UP PER FETUS: CPT | Performed by: STUDENT IN AN ORGANIZED HEALTH CARE EDUCATION/TRAINING PROGRAM

## 2024-02-23 ENCOUNTER — LAB (OUTPATIENT)
Dept: LAB | Facility: LAB | Age: 34
End: 2024-02-23
Payer: COMMERCIAL

## 2024-02-23 ENCOUNTER — ROUTINE PRENATAL (OUTPATIENT)
Dept: OBSTETRICS AND GYNECOLOGY | Facility: CLINIC | Age: 34
End: 2024-02-23
Payer: COMMERCIAL

## 2024-02-23 VITALS — SYSTOLIC BLOOD PRESSURE: 122 MMHG | DIASTOLIC BLOOD PRESSURE: 68 MMHG | WEIGHT: 176 LBS | BODY MASS INDEX: 28.41 KG/M2

## 2024-02-23 DIAGNOSIS — O28.3 ABNORMAL FETAL ULTRASOUND: ICD-10-CM

## 2024-02-23 DIAGNOSIS — R73.09 ELEVATED GLUCOSE: ICD-10-CM

## 2024-02-23 DIAGNOSIS — B19.10 HEPATITIS B AFFECTING PREGNANCY (HHS-HCC): ICD-10-CM

## 2024-02-23 DIAGNOSIS — O24.410 DIET CONTROLLED GESTATIONAL DIABETES MELLITUS (GDM) IN THIRD TRIMESTER (HHS-HCC): ICD-10-CM

## 2024-02-23 DIAGNOSIS — Z3A.30 30 WEEKS GESTATION OF PREGNANCY (HHS-HCC): Primary | ICD-10-CM

## 2024-02-23 DIAGNOSIS — O98.419 HEPATITIS B AFFECTING PREGNANCY (HHS-HCC): ICD-10-CM

## 2024-02-23 PROBLEM — O24.419 GESTATIONAL DIABETES MELLITUS (GDM) IN THIRD TRIMESTER (HHS-HCC): Status: ACTIVE | Noted: 2024-02-23

## 2024-02-23 LAB
GLUCOSE 1H P 100 G GLC PO SERPL-MCNC: 203 MG/DL
GLUCOSE 2H P 100 G GLC PO SERPL-MCNC: 155 MG/DL
GLUCOSE 3H P 100 G GLC PO SERPL-MCNC: 109 MG/DL
GLUCOSE P FAST SERPL-MCNC: 96 MG/DL

## 2024-02-23 PROCEDURE — 0501F PRENATAL FLOW SHEET: CPT

## 2024-02-23 PROCEDURE — 82950 GLUCOSE TEST: CPT

## 2024-02-23 PROCEDURE — 82952 GTT-ADDED SAMPLES: CPT

## 2024-02-23 PROCEDURE — 87517 HEPATITIS B DNA QUANT: CPT

## 2024-02-23 PROCEDURE — 82947 ASSAY GLUCOSE BLOOD QUANT: CPT

## 2024-02-23 RX ORDER — LANCETS
1 EACH MISCELLANEOUS 4 TIMES DAILY
Qty: 120 EACH | Refills: 0 | Status: SHIPPED | OUTPATIENT
Start: 2024-02-23 | End: 2024-03-06 | Stop reason: SDUPTHER

## 2024-02-23 RX ORDER — ISOPROPYL ALCOHOL 70 ML/100ML
1 SWAB TOPICAL 4 TIMES DAILY
Qty: 120 EACH | Refills: 0 | Status: SHIPPED | OUTPATIENT
Start: 2024-02-23 | End: 2024-03-19 | Stop reason: SDUPTHER

## 2024-02-23 RX ORDER — INSULIN PUMP SYRINGE, 3 ML
1 EACH MISCELLANEOUS AS NEEDED
Qty: 1 EACH | Refills: 0 | Status: SHIPPED | OUTPATIENT
Start: 2024-02-23 | End: 2025-02-22

## 2024-02-23 NOTE — PROGRESS NOTES
Patient presents today for OBFU.  Patient completed 3hr glucose testing this morning.  Patient c/o numbness and dull cramping on upper left side of belly.    ANABELLA PENNY MA    Subjective     Marisabel Segundo is a 34 y.o.  at 30w1d with a working estimated date of delivery of 2024, by Embryo Transfer who presents for a routine prenatal visit. She denies vaginal bleeding, leakage of fluid, decreased fetal movements, or contractions.    Patient completed 3 hour today and had three abnormal values confirming gdm diagnosis. Discussed with patient management moving forward including testing blood sugars and maintaining log, communicating blood sugars to sugar line after nutrition counseling, MFM and nutrition referral, as well as growth US.     Reviewed recent growth US- normal interval growth noted. There is a small echogenic region within the lung that is suggestive of microcytic CPAM though imaging artifact can also be considered. Repeat screening is scheduled in two weeks.     Patient considering midwifery care as she desires low intervention. Self study, HBC, birth preferences provided for review. Reviewed with patient that if she requires insulin for gdm she will not be a candidate for midwifery care.     Her pregnancy is complicated by:  Medical Problems       Problem List       History of hepatitis B virus infection    Overview Addendum 2024  9:52 AM by Lottie Mcgrath MD     -Suspect HBV inactive (prior infection, now cleared) based on positive Ab to core with neg surface Ag and negative viral load  -Follows with Dr. Santiago (GI), last visit 24  -For repeat viral load in Feb and March -> if above 10k, consider tenofovir   -Per peds and peds ID, infant does NOT require HBIG postpartum  -No change in delivery plan or labor management         Female infertility    Overview Signed 10/10/2023 12:04 PM by Suzi Hart MD     IVF pregnancy: plan serial growth, weekly NSTs at 36 wks and delivery  39-39.6         Subclinical hypothyroidism    Overview Addendum 2/8/2024  8:24 AM by Lottie Mcgrath MD     -TSH 0.3 (10/10) -> synthroid decreased from 75mcg to 50mcg -> TSH 0.61 (11/14)  -Second trimester TSH 1.01  [ ] Repeat TSH in third trimester         Encounter for supervision of normal first pregnancy in first trimester    Overview Addendum 2/8/2024  2:29 PM by Lottie Mcgrath MD     -ASA at 12 wks  -s/p risk-reducing cfDNA, normal NT, and normal anatomy  -Rh positive, rubella immune  -s/p flu shot for this year 9/2023  -s/p COVID vaccine this year  -1hr GCT = 179, 3hr GTT pending  [ ] TSH at 36wga  [ ] Growth at 30 and 36wga (next scheduled 2/22)  [ ] NSTs at 36wga  [ ] Delivery in 39th week    Next Visit  [ ] Discuss IOL - methods, ARRIVE trial  [ ] Review 30 week growth  [ ] Confirm completed 3hr         ASCUS of cervix with negative high risk HPV    Overview Signed 1/5/2024  8:24 AM by Lottie Mcgrath MD     -Last pap ASCUS/HRHPV neg in 08/2021  -Repeat cotest due in 08/2024               Objective   Weight: 79.8 kg (176 lb)  TWG: 10.9 kg (24 lb)  Pregravid BMI: 24.55    BP: 122/68          Prenatal Labs:  Lab Results   Component Value Date    HGB 11.4 (L) 02/08/2024    HCT 34.7 (L) 02/08/2024     02/08/2024    ABO AB 09/22/2023    LABRH POS 09/22/2023    NEISSGONOAMP NEGATIVE 09/22/2023    CHLAMTRACAMP NEGATIVE 09/22/2023    SYPHT NONREACTIVE 09/22/2023    HEPBSAG Nonreactive 01/05/2024    HIV1X2 NONREACTIVE 09/22/2023    URINECULTURE MIXED URETHRAL NIKKI. 09/22/2023       Assessment/Plan   Repeat hepatitis B viral load at next visit  Review repeat US: CPAM vs artifact  Collect birth preferences  New GDM dx- testing supplies sent to patient's pharmacy.   -Patient instructed to test fasting and one hour post prandial blood sugars, desired values reviewed  -MFM apt and Nutrition referrals placed  -Growth US ordered  -Diabetes tool kit provided to patient    Follow up in 2 weeks for a routine  prenatal visit.    Prenatal visit length 30 minutes with greater than 50% spent counseling and coordinating care as discussed above.      DARNELL Ott-ANNITA

## 2024-02-27 ENCOUNTER — TELEPHONE (OUTPATIENT)
Dept: OBSTETRICS AND GYNECOLOGY | Facility: CLINIC | Age: 34
End: 2024-02-27

## 2024-02-27 NOTE — TELEPHONE ENCOUNTER
30.5 wk ob left message on ob line inquiring getting a continuous glucose monitor instead of poking her finger 4 times a day

## 2024-02-29 ENCOUNTER — TELEMEDICINE CLINICAL SUPPORT (OUTPATIENT)
Dept: NUTRITION | Facility: CLINIC | Age: 34
End: 2024-02-29
Payer: COMMERCIAL

## 2024-02-29 ENCOUNTER — APPOINTMENT (OUTPATIENT)
Dept: MATERNAL FETAL MEDICINE | Facility: HOSPITAL | Age: 34
End: 2024-02-29
Payer: COMMERCIAL

## 2024-02-29 ENCOUNTER — APPOINTMENT (OUTPATIENT)
Dept: RADIOLOGY | Facility: HOSPITAL | Age: 34
End: 2024-02-29
Payer: COMMERCIAL

## 2024-02-29 DIAGNOSIS — O24.410 DIET CONTROLLED GESTATIONAL DIABETES MELLITUS (GDM) IN THIRD TRIMESTER (HHS-HCC): ICD-10-CM

## 2024-02-29 PROCEDURE — 97802 MEDICAL NUTRITION INDIV IN: CPT | Mod: 95

## 2024-02-29 NOTE — PATIENT INSTRUCTIONS
Nutrition Education Material: MyPlate for Gestational Diabetes and [facility]: Diet for the Management of Diabetes in Pregnancy   Provided patient with my office phone # and email address for any further questions.

## 2024-02-29 NOTE — PROGRESS NOTES
"Nutrition Assessment     Reason for Visit:  Marisabel Segundo is a 34 y.o. female who presents for GDM (31 wks GA)  Pt scheduled for a virtual appointment. Identification was verified with 2 sources of identification. Patient was in [place] at time of visit.  HIPAA protocol was maintained.     Anthropometrics:   as of 2/23/2024   Height:  1.676 m (5' 6\")    Weight:  79.8 kg (176 lb)   Prepregnancy Weight:  152#    Significant Past Medical Hx: PCOS    Biochemical/Laboratory Data:  Lab Results   Component Value Date    HGBA1C 5.1 09/22/2023    CHOL 195 01/05/2024    LDLF 100 (H) 01/28/2022    TRIG 332 (H) 01/05/2024     Pertinent Medications:  Vit D, Aspirin, PNV    Food And Nutrient Intake:  Food and Nutrient History  Food and Nutrient History: Met w/ pt to obtain diet hx and instruct  on diet guidelines for GDM. Pt has been checking BG x 4 days and all have been WNL.  Diet hx indicates pt w/ overall balanced meals that fall within diet guidelines for carbs and include protein/fiber with meals.  Reports she sometimes may skip a meal and just have a snack, but has been trying to do better with consistent meals during pregnancy.     Food Intake  Meal 1: 9a - 2 eggs, smoothie (spinach, banana, PB, greek yogurt)  Meal 2: 12-2p - Rice/noodle soup with chicken or meatballs, bok rayray OR lentil soup OR egg/cheese sandwich on sourdough w/ kimchi  Meal 3: 6-8p - eggs, rice, kimchi OR short ribs, rice, vegs  Snacks: nuts, kenneth pudding, banana w/ PB, popcorn, chips or pretzels  Beverages: water, tea    Food Preparation  Cooking: Patient, Spouse/Significant Other  Grocery Shopping: Patient  Dining Out: 1 to 3 times a month  Grocery Shopping Schedule: Planned weekly shopping  Convenience/Processed Foods: Processed Salty Snacks   3-4 times per week  Cooking Skills/Barriers: None reported  Meal Preparation Habits: Has cooking skills and Little preplanning of meals  Eating Out Type: Lunch, Dinner, and Restaurant       OB Nutrition Intake  Weeks " of Gestation: 31  Pre-Pregnancy Weight: 152#     Physical Activities:  Physical Activity  Physical Activity History: reports limited physical activity, occasional yoga     Nutrition Diagnosis      Nutrition Diagnosis  Patient has Nutrition Diagnosis: Yes  Diagnosis Status (1): New  Nutrition Diagnosis 1: Altered nutrition related to laboratory values  Related to (1): glycemic control during pregnancy  As Evidenced by (1): failed GTT and new dx of GDM    Nutrition Interventions/Recommendations   Nutrition Prescription  Individualized Nutrition Prescription Provided for :  (Consistent Carb Diet:  B-15-30gm, S-0-15gm, L-45gm,  S-15-30gm, D-45gm, S-15-30gm)  Nutrition Education  Nutrition Education Content: Content related nutrition education, Education on nutrition's influence on health  Goals: 1) Use meal plan provided to plan meals 2) Aim to include protein and fiber with meals and snacks 3) Consistent meal/snack times  4) Limit foods with added sugars  4) Implement regular movement    Nutrition Monitoring and Evaluation   Food/Nutrient Related History Monitoring  Monitoring and Evaluation Plan: Meal/snack pattern, Carbohydrate intake  Meal/Snack Pattern: Estimated meal and snack pattern  Criteria: consistent meals/snack  Estimated carbohydrate intake: Estimated carbohydrate intake  Criteria: follows meal plan for carb allowance at meals/snacks  Body Composition/Growth/Weight History  Monitoring and Evaluation Plan: Weight change  Weight Change: Weight gain  Criteria: weight gain for pregnancy WNL  Biochemical Data, Medical Tests and Procedures  Monitoring and Evaluation Plan: Glucose/endocrine profile  Glucose/Endocrine Profile: Other (Comment) (SMBG)  Criteria: WNL  Evaluation of effectiveness of diet intervention/diet eduction include:  changes in biochemical data; changes in anthropometric measurements; changes in diet hx; patient understanding and implementation of goals set with patient and diet education  provided.

## 2024-03-06 ENCOUNTER — ROUTINE PRENATAL (OUTPATIENT)
Dept: OBSTETRICS AND GYNECOLOGY | Facility: CLINIC | Age: 34
End: 2024-03-06
Payer: COMMERCIAL

## 2024-03-06 ENCOUNTER — INITIAL PRENATAL (OUTPATIENT)
Dept: MATERNAL FETAL MEDICINE | Facility: CLINIC | Age: 34
End: 2024-03-06
Payer: COMMERCIAL

## 2024-03-06 VITALS — WEIGHT: 175 LBS | BODY MASS INDEX: 28.25 KG/M2 | DIASTOLIC BLOOD PRESSURE: 87 MMHG | SYSTOLIC BLOOD PRESSURE: 133 MMHG

## 2024-03-06 VITALS — DIASTOLIC BLOOD PRESSURE: 68 MMHG | WEIGHT: 177.2 LBS | BODY MASS INDEX: 28.6 KG/M2 | SYSTOLIC BLOOD PRESSURE: 118 MMHG

## 2024-03-06 DIAGNOSIS — O24.410 DIET CONTROLLED GESTATIONAL DIABETES MELLITUS (GDM) IN THIRD TRIMESTER (HHS-HCC): ICD-10-CM

## 2024-03-06 DIAGNOSIS — Z34.01 ENCOUNTER FOR SUPERVISION OF NORMAL FIRST PREGNANCY IN FIRST TRIMESTER (HHS-HCC): ICD-10-CM

## 2024-03-06 DIAGNOSIS — O28.3 ABNORMAL FETAL ULTRASOUND: ICD-10-CM

## 2024-03-06 DIAGNOSIS — Z3A.31 31 WEEKS GESTATION OF PREGNANCY (HHS-HCC): Primary | ICD-10-CM

## 2024-03-06 PROCEDURE — 0501F PRENATAL FLOW SHEET: CPT

## 2024-03-06 PROCEDURE — 99215 OFFICE O/P EST HI 40 MIN: CPT | Performed by: STUDENT IN AN ORGANIZED HEALTH CARE EDUCATION/TRAINING PROGRAM

## 2024-03-06 RX ORDER — LANCETS
1 EACH MISCELLANEOUS 4 TIMES DAILY
Qty: 120 EACH | Refills: 4 | Status: SHIPPED | OUTPATIENT
Start: 2024-03-06

## 2024-03-06 NOTE — PROGRESS NOTES
Henok Segundo is a 34 y.o.  at 31w6d with a working estimated date of delivery of 2024, by Embryo Transfer who presents for a routine prenatal visit. She denies vaginal bleeding, leakage of fluid, decreased fetal movements, or contractions.    Patient s/p MFM consult for GDMA1. Blood sugars 80 percent within range. Patient to return to at 36 weeks for growth US and delivery recommendations.     Repeat US for evaluation of possible CPAM scheduled for tomorrow. Patient has tentative appointments scheduled with neonatology and pediatric general surgery in the event that CPAM dx is confirmed.     Hep B viral load repeated per GI and continues to be not detected.     Her pregnancy is complicated by:  Medical Problems       Problem List       History of hepatitis B virus infection    Overview Addendum 2024  9:52 AM by Lottie Mcgrath MD     -Suspect HBV inactive (prior infection, now cleared) based on positive Ab to core with neg surface Ag and negative viral load  -Follows with Dr. Santiago (GI), last visit 24  -For repeat viral load in Feb and March -> if above 10k, consider tenofovir   -Per peds and peds ID, infant does NOT require HBIG postpartum  -No change in delivery plan or labor management         Female infertility    Overview Signed 10/10/2023 12:04 PM by Suzi Hart MD     IVF pregnancy: plan serial growth, weekly NSTs at 36 wks and delivery 39-39.6         Subclinical hypothyroidism    Overview Addendum 2024  8:24 AM by Lottie Mcgrath MD     -TSH 0.3 (10/10) -> synthroid decreased from 75mcg to 50mcg -> TSH 0.61 ()  -Second trimester TSH 1.01  [ ] Repeat TSH in third trimester         Encounter for supervision of normal first pregnancy in first trimester    Overview Addendum 2024  2:29 PM by Lottie Mcgrath MD     -ASA at 12 wks  -s/p risk-reducing cfDNA, normal NT, and normal anatomy  -Rh positive, rubella immune  -s/p flu shot for this year 2023  -s/p COVID  vaccine this year  -1hr GCT = 179, 3hr GTT pending  [ ] TSH at 36wga  [ ] Growth at 30 and 36wga (next scheduled 2/22)  [ ] NSTs at 36wga  [ ] Delivery in 39th week    Next Visit  [ ] Discuss IOL - methods, ARRIVE trial  [ ] Review 30 week growth  [ ] Confirm completed 3hr         ASCUS of cervix with negative high risk HPV    Overview Signed 1/5/2024  8:24 AM by Lottie Mcgrath MD     -Last pap ASCUS/HRHPV neg in 08/2021  -Repeat cotest due in 08/2024         Gestational diabetes mellitus (GDM) in third trimester    Overview Signed 2/23/2024  5:32 PM by SOLE Ott     Growth US at 32 and 36 weeks.   MFM referral placed  Nutrition counseling scheduled         Abnormal fetal ultrasound    Overview Signed 2/23/2024  5:42 PM by SOLE Ott     Small echogenic region within the lung that is suggestive of microcytic CPAM though imaging artifact can also be considered. Repeat US in 2 weeks               Objective      TWG: 10.4 kg (23 lb)  Pregravid BMI: 24.55               Prenatal Labs:  Lab Results   Component Value Date    HGB 11.4 (L) 02/08/2024    HCT 34.7 (L) 02/08/2024     02/08/2024    ABO AB 09/22/2023    LABRH POS 09/22/2023    NEISSGONOAMP NEGATIVE 09/22/2023    CHLAMTRACAMP NEGATIVE 09/22/2023    SYPHT NONREACTIVE 09/22/2023    HEPBSAG Nonreactive 01/05/2024    HIV1X2 NONREACTIVE 09/22/2023    URINECULTURE MIXED URETHRAL NIKKI. 09/22/2023       Assessment/Plan   Review repeat US for CPAM,  and neonatology and ped surgery consultation notes  Discuss birth preferences at next visit.   Follow up in 2 weeks for a routine prenatal visit.    SOLE Ott

## 2024-03-06 NOTE — PROGRESS NOTES
Nellie Segundo is a 35 yo  @ 31w6d who presents for a MFM consultation for newly diagnosed gestational diabetes. Nellie had an elevated 1 hour glucola of 179 mg/dL followed by an abnormal 3 hour GTT.    Nellie has been checking her BG 4x daily and reports the following values (did not bring in her log today):    Fasting: majority <95  Breakfast: majority <140  Lunch: majority <140  Dinner: majority <140    She is otherwise doing well today and denies leakage of fluid or vaginal bleeding. Nellie reports good fetal movement.    Nellie has an appointment with Dr. Sierra tomorrow to discuss the findings concern for a CPAM on ultrasound.    PMHX: PCOS, prior Hepatitis B infection (cleared)  PSHX: right shoulder surgery  OBHX:   GYN: denies   MEDS: PNV, vitamin D, ldASA  ALL: NKDA  SOCIAL: denies tobacco/alcohol/illicit drug use  FAMILY: paternal grandfather - DMII    O: /87   Wt 79.4 kg (175 lb)   LMP 2023   BMI 28.25 kg/m²   Gen: NAD  Resp: nonlabored breathing  Cardiac: good peripheral perfusion  Abd: gravid  Psych: appropriate mood and affect  FHTs: 130    A/P: Nellie Segundo is a 35 yo  @ 31w6d who presents for a MFM consultation for newly diagnosed gestational diabetes.     Gestational Diabetes:    1. The significance and management of gestational diabetes in pregnancy were reviewed. We also discussed maternal and  risks including preeclampsia, macrosomia, birth trauma,  delivery,  hypoglycemia and hyperbilirubinemia, stillbirth and risk for the subsequent development of type-2 diabetes mellitus.    2. Diabetes education and nutritional counseling were previously provided. Nellie was instructed on dietary modification and blood glucose monitoring. We recommend glucometer testing four times daily (fasting and 1 hour post-prandials).    RECOMMENDATIONS:     1. Glycemic control: Nellie should target glucose ranges of fastings 60-95 mg/dL and 1 hour post-prandial values less than 140  mg/dL.    2. Therapy: I reviewed Marisabel's glycemic profile today. Overall, her control is good, thus medical therapy not recommended.     3. Antepartum testing should include daily fetal kick counts. For those on insulin, NSTs are recommended once weekly starting at approximately 32 weeks EGA and twice weekly at 36 weeks EGA. We recommend an ultrasound to evaluate fetal growth and amniotic fluid index at 36 weeks gestation.    4. Timing of delivery may be planned for 37-39 weeks pending glucose control. Route of delivery and timing may also depend on ultrasound estimation of fetal weight and obstetrical history. We would be happy to see Marisabel in the Maternal Fetal Medicine office for her 34 week fetal growth ultrasound as well as a return consultation to assist with delivery planning.    5. Postpartum: Marisabel has a 50% risk for the development of type-2 diabetes mellitus within the next 10-15 years. We recommend a 75-g glucose tolerance test 4-6 weeks postpartum and then for the patient to have periodic evaluation of this risk by her primary care physician.    FOLLOWUP PLAN: Marisabel will be following up with you for her continued prenatal care. She will be working with us to manage her blood sugars and medication regimen. We will plan on seeing her back for a followup growth ultrasound and return consultation for delivery planning at around 34 weeks.    We appreciate the opportunity to participate in Marisabel's care and look forward to working with her to manage her diabetes. Please contact us if you have any additional questions or concerns.    Ponce Baltazar MD  Maternal-Fetal Medicine

## 2024-03-07 ENCOUNTER — INITIAL PRENATAL (OUTPATIENT)
Dept: MATERNAL FETAL MEDICINE | Facility: HOSPITAL | Age: 34
End: 2024-03-07
Payer: COMMERCIAL

## 2024-03-07 ENCOUNTER — DOCUMENTATION (OUTPATIENT)
Dept: OBSTETRICS AND GYNECOLOGY | Facility: HOSPITAL | Age: 34
End: 2024-03-07
Payer: COMMERCIAL

## 2024-03-07 ENCOUNTER — APPOINTMENT (OUTPATIENT)
Dept: MATERNAL FETAL MEDICINE | Facility: HOSPITAL | Age: 34
End: 2024-03-07
Payer: COMMERCIAL

## 2024-03-07 ENCOUNTER — HOSPITAL ENCOUNTER (OUTPATIENT)
Dept: RADIOLOGY | Facility: HOSPITAL | Age: 34
Discharge: HOME | End: 2024-03-07
Payer: COMMERCIAL

## 2024-03-07 VITALS — DIASTOLIC BLOOD PRESSURE: 86 MMHG | WEIGHT: 173.6 LBS | SYSTOLIC BLOOD PRESSURE: 125 MMHG | BODY MASS INDEX: 28.02 KG/M2

## 2024-03-07 DIAGNOSIS — O35.9XX0 KNOWN FETAL ANOMALY, ANTEPARTUM, SINGLE OR UNSPECIFIED FETUS (HHS-HCC): ICD-10-CM

## 2024-03-07 DIAGNOSIS — O35.CXX0: ICD-10-CM

## 2024-03-07 DIAGNOSIS — Z34.01 ENCOUNTER FOR SUPERVISION OF NORMAL FIRST PREGNANCY IN FIRST TRIMESTER (HHS-HCC): ICD-10-CM

## 2024-03-07 DIAGNOSIS — O24.410 DIET CONTROLLED GESTATIONAL DIABETES MELLITUS (GDM) IN THIRD TRIMESTER (HHS-HCC): ICD-10-CM

## 2024-03-07 DIAGNOSIS — O28.3 ABNORMAL FETAL ULTRASOUND: ICD-10-CM

## 2024-03-07 DIAGNOSIS — O16.3 ELEVATED BLOOD PRESSURE AFFECTING PREGNANCY IN THIRD TRIMESTER, ANTEPARTUM (HHS-HCC): Primary | ICD-10-CM

## 2024-03-07 PROCEDURE — 76819 FETAL BIOPHYS PROFIL W/O NST: CPT

## 2024-03-07 PROCEDURE — 99214 OFFICE O/P EST MOD 30 MIN: CPT | Performed by: OBSTETRICS & GYNECOLOGY

## 2024-03-07 PROCEDURE — 76815 OB US LIMITED FETUS(S): CPT | Performed by: OBSTETRICS & GYNECOLOGY

## 2024-03-07 PROCEDURE — 76819 FETAL BIOPHYS PROFIL W/O NST: CPT | Performed by: OBSTETRICS & GYNECOLOGY

## 2024-03-07 PROCEDURE — 76815 OB US LIMITED FETUS(S): CPT

## 2024-03-08 ENCOUNTER — APPOINTMENT (OUTPATIENT)
Dept: OBSTETRICS AND GYNECOLOGY | Facility: CLINIC | Age: 34
End: 2024-03-08
Payer: COMMERCIAL

## 2024-03-08 ENCOUNTER — APPOINTMENT (OUTPATIENT)
Dept: PEDIATRIC CARDIOLOGY | Facility: CLINIC | Age: 34
End: 2024-03-08
Payer: COMMERCIAL

## 2024-03-08 ENCOUNTER — TELEPHONE (OUTPATIENT)
Dept: MATERNAL FETAL MEDICINE | Facility: HOSPITAL | Age: 34
End: 2024-03-08

## 2024-03-08 NOTE — TELEPHONE ENCOUNTER
Patient is new to the Bloods Sugar Support Line    Blood Sugar Support Line Communication   Communicated with the patient on 3/8/2024   She has Gestational Diabetes @ 32w1d    The patient checks her sugars fasting and 1 hour after meals. Her current regimen is as follows:  Nutrition plan alone     The patient's reported blood sugars appear well controlled, with 80% within the goal range. Goal range glucose is Fasting <95, 1 hr after meals <140.   No changes to her current treatment plan are indicated at this time.    Patient understands to submit sugar log for review weekly through the Blood Sugar Line @ [phone] or via email to [email or web address] to help optimize glucose control.    A voice mail message was left at the contact number provided by the patient.

## 2024-03-09 NOTE — PROGRESS NOTES
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  In retrospect, It was likely present on the 19 week evaluation but was very small.   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  The FOB is not.  This is an IVF pregnancy. She had a rr cfDNA. She is hypothyroid on levothyroxine.  - Normal AFV  - BPP 8/8  - The CPAM is again noted in the left lower thorax adjacent to the heart. The pulmonary vasculature is deviated cranial by this structure however there is no connection seen. Flow from the worta cannot be documented. It is pyramidal in shape.  The CVR is 0.13 which is in the good prognosis range.  On the previous scan, the CVR was listed at 0.04.  However, review of that scan shows it was not measured in the standard planes.  Remeasuring the mass and recalculation shows the CVR is actually 0.08. Thus the mass has not significantly grown.  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 However many of these are mixed lesions.  The patient was informed of the above and will meet with the surgeons in 2 weeks.  Growth scheduled in 2 weeks.  THank you for allowing us to  participate in the care of your patient    Counseling Provided:  The following counseling was provided regarding fetal CPAM:  A congenital pulmonary airway malformation is a mass of abnormal lung tissue that forms during early pregnancy.  It is usually located at the base of 1 lung lobe and this tissue does not function as normal lung tissue.  Some of these are formed because an area of the lung did not normally attach to the bronchi or breathing tubes of the lung.  Therefore it is normal lung tissue but none functional.  These are called bronchopulmonary sequestration's (BPS).  Others form by an area of lung that becomes stuck in an earlier stage of development.  It does not form normal tissues properly and retains the growth rate  "of an earlier gestational age.  These are called cystic adenomatoid malformations (CCAM).  These can grow at a rapid rate compressing the normal lung tissue.  Many of these lesions are mixed containing both types of malformations.  There is no known cause for CPAMs and they are not felt to be hereditary.  They do not show any recurrence within families.    We do know there are certain genetic mutations or changes that likely lead to the development of cystic adenomatoid malformations.  These lead to an area of the lung which is \"stuck\" in an earlier stage of development. This area of lung can show an increased growth rate causing it to not allow the normal lung to grow. These areas can become quite large. Administering steroids to the mother shrinks the mass 80% of the time if it does not contain large cysts. There is no known association with IVF.    The growth of the mass, blood supply and shape gives us clues as to which type of mass this is. CCAMS are irregular in shape with blood supply from the pulmonary arteries and grows quickly.  BPS is well defined, pyramidal, with aortic blood flow. Its growth is concomitant with growth of the baby.  Your child appears to have a BPS, however this is a pathologic diagnosis and cannot be made by ultrasound.    The natural history of these lesions is they can be stable throughout gestation or may grow at a very rapid rate causing compression of other structures such as the heart and lung.  If the heart becomes sufficiently compressed, it may not function well leading to a problem called fetal hydrops.  Fetal hydrops is a condition where fluid accumulates around the heart lungs and abdomen.  This is an indication that the baby is very sick and interventions may be needed.  These masses cannot expand until a fetus is 28 to 30 weeks.  At that point they often regress.     We monitor  the size of this mass by weekly ultrasound evaluations.  As the fetus is also growing, we try " to evaluate if the mass is growing in a more rapid rate than the rest of the baby.  We do this by measuring the size of the mass in 3 dimensions and comparing it to the head size.  This is called the CVR or CPAM volume ratio.  If the ratio exceeds 1.6, there is a higher risk for the development of hydrops.  Another ultrasound indicator of serious problems is thickening or swelling of the placenta.      Your baby has a CVR of 0.13.  This is well below the size where a problem usually occurs. The previous CVR was 0.08.  This may not reflect actual growth as a still image was measured to perform this calculation as the initial one was not measured in correct planes.    For most babies with CPM, the outlook is excellent as the size usually regresses or stays stable in the third trimester.  Sometimes, the lesions cannot even be seen at the end of pregnancy.  The lungs seem to develop normally despite the presence of these lesions.  We would not expect any problems in the delivery room with a CPAM of this size.     evaluation depends on clinical findings. If the infant is symptomatic with respiratory issues, if the mass comprises >20% of the hemithorax, or if there is a family history of pleuropulmonary blastoma, then surgical resection is the management of choice. For asymptomatic patients, there is a debate about whether the patient should have the lesion electively resected versus taking the conservative approach and observing for symptoms. Often in older children with minor symptoms, resection is done as well to prevent recurrent infections or the potential for malignancy, dependent on the type of lesion.  The pediatric team will discuss these approaches with you.    I hope this information is helpful. Please contact me for any questions or concerns.

## 2024-03-14 ENCOUNTER — PATIENT MESSAGE (OUTPATIENT)
Dept: MATERNAL FETAL MEDICINE | Facility: CLINIC | Age: 34
End: 2024-03-14
Payer: COMMERCIAL

## 2024-03-18 DIAGNOSIS — O24.410 DIET CONTROLLED GESTATIONAL DIABETES MELLITUS (GDM) IN THIRD TRIMESTER (HHS-HCC): ICD-10-CM

## 2024-03-19 RX ORDER — ISOPROPYL ALCOHOL 0.75 G/1
SWAB TOPICAL
Qty: 90 EACH | Refills: 3 | Status: SHIPPED | OUTPATIENT
Start: 2024-03-19 | End: 2024-06-17

## 2024-03-20 ENCOUNTER — ANCILLARY PROCEDURE (OUTPATIENT)
Dept: PEDIATRIC CARDIOLOGY | Facility: CLINIC | Age: 34
End: 2024-03-20
Payer: COMMERCIAL

## 2024-03-20 ENCOUNTER — OFFICE VISIT (OUTPATIENT)
Dept: PEDIATRIC CARDIOLOGY | Facility: CLINIC | Age: 34
End: 2024-03-20
Payer: COMMERCIAL

## 2024-03-20 VITALS
WEIGHT: 179.45 LBS | OXYGEN SATURATION: 100 % | TEMPERATURE: 97.6 F | SYSTOLIC BLOOD PRESSURE: 133 MMHG | HEIGHT: 66 IN | BODY MASS INDEX: 28.84 KG/M2 | DIASTOLIC BLOOD PRESSURE: 85 MMHG | HEART RATE: 73 BPM

## 2024-03-20 DIAGNOSIS — O35.BXX0 ABNORMAL FETAL ECHOCARDIOGRAPHY AFFECTING ANTEPARTUM CARE OF MOTHER, SINGLE OR UNSPECIFIED FETUS (HHS-HCC): Primary | ICD-10-CM

## 2024-03-20 DIAGNOSIS — O28.3 ABNORMAL FETAL ULTRASOUND: ICD-10-CM

## 2024-03-20 DIAGNOSIS — O35.8XX0 MATERNAL CARE FOR OTHER (SUSPECTED) FETAL ABNORMALITY AND DAMAGE, NOT APPLICABLE OR UNSPECIFIED (HHS-HCC): ICD-10-CM

## 2024-03-20 PROCEDURE — 1036F TOBACCO NON-USER: CPT | Performed by: PEDIATRICS

## 2024-03-20 PROCEDURE — 3079F DIAST BP 80-89 MM HG: CPT | Performed by: PEDIATRICS

## 2024-03-20 PROCEDURE — 76827 ECHO EXAM OF FETAL HEART: CPT | Performed by: PEDIATRICS

## 2024-03-20 PROCEDURE — 93325 DOPPLER ECHO COLOR FLOW MAPG: CPT | Performed by: PEDIATRICS

## 2024-03-20 PROCEDURE — 3048F LDL-C <100 MG/DL: CPT | Performed by: PEDIATRICS

## 2024-03-20 PROCEDURE — 99204 OFFICE O/P NEW MOD 45 MIN: CPT | Performed by: PEDIATRICS

## 2024-03-20 PROCEDURE — 3075F SYST BP GE 130 - 139MM HG: CPT | Performed by: PEDIATRICS

## 2024-03-20 NOTE — PATIENT INSTRUCTIONS
The fetal echocardiogram performed today was normal.  The heart is built normally.  The heart function is normal.  The heart beat is normal.  There is no need for changes to your delivery plan.  Your baby does not need to see pediatric cardiology unless your pediatrician has concerns.  Sometimes it is not possible to see all the heart structures because of the position or size of the fetus. This does not mean they are not there, but may mean that for technical reasons they cannot be assessed. Sometimes this information may not be important; while in some cases it means that definite answers are not possible. The echocardiographer will discuss this with you if necessary, and repeat studies are frequently performed later in the pregnancy.     Certain congenital heart abnormalities are also hard to detect by fetal echocardiograms, but often these are simple abnormalities. We do not mention this to concern you, but rather that you understand that there are technical limitations to such studies. It remains important that your pediatrician provides a normal careful medical examination of the baby (including the heart) takes place after birth, and if there were any suspicious cardiac findings, that these are evaluated in the usual way irrespective of the findings at fetal study.     Certain communications between the two sides of the circulation are normally present in all developing babies and normally close after birth. We are not able to tell in advance whether this will occur, however there is only a tiny chance that they will not. Persistence of these structures is generally not a difficult problem to deal with.     We direct our attention only to the heart, where we have special expertise. This is not the same as your general obstetric ultrasound scan. Other ultrasound information about the fetus can be obtained from an obstetric ultrasonographer or your obstetrician.

## 2024-03-20 NOTE — PROGRESS NOTES
Marisabel Segundo was seen at the request of Lottie Mcgrath for a chief complaint of CPAM and pregnancy as the result of IVF; a report with my findings is being sent via written or electronic means the referring physician with my recommendations for treatment.     I had the pleasure of seeing Marisabel Segundo in Pediatric Cardiology consultation at our [place] location as part of our prenatal heart program for CPAM and pregnancy as the result of IVF.  She is a 34 y.o. year-old  woman, currently 33w6d weeks gestation. Patient's last menstrual period was 2023. Estimated Date of Delivery: 24.  There have been no pregnancy complications.   She has not been hospitalized during this pregnancy.  She had a NIPT, which was normal.  She had a second trimester ultrasound which was normal.  She had an ultrasound at 30 weeks that showed a CPAM with a CVR of 0.13.    Prior to the visit, I personally reviewed the cardiac portions of the obstetrical ultrasound performed on 24.  There is normal segmental anatomy with normal 4 chamber, outflow tract and 3 vessel views.  There is no evidence of septation defect, right or left ventricular outflow obstruction or significant valvular regurgitation.    Her previous obstetrical history is without complication.  Her past medical history is significant for hypothyroidism and Hepatitis B. She has no history of congenital heart disease, arrhythmia, cardiomyopathy, hypercholesterolemia, hypertension, diabetes, rheumatic heart disease, cancer, asthma, lupus, Sjogren syndrome, clotting disorder, depression, anxiety, alcohol abuse, phenylketonuria, or DiGeorge.  She has had shoulder surgery.  She takes Aspirin, vitamin D, and Synthroid.  She has No Known Allergies. She is currently taking prenatal vitamins.      Her family history is negative for congenital heart disease, early atherosclerosis, sudden cardiac death, long QT syndrome, cardiomyopathy, aortic aneurysm, or genetic or  "metabolic disease.  FOB's grandfather passed away from an MI at age 54.    She currently lives with her spouse and is .  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  She denies illicit drug use or alcohol abuse.  She denies verbal, sexual, or physical abuse.     Delivery [facility]  Father of the baby's name: Danielle    /85 (BP Location: Right arm, Patient Position: Sitting)   Pulse 73   Temp 36.4 °C (97.6 °F)   Ht 1.675 m (5' 5.95\")   Wt 81.4 kg (179 lb 7.3 oz)   LMP 2023   SpO2 100%   BMI 29.01 kg/m²     She was resting comfortably in the examination room and alert, active and in no respiratory distress. Skin was without rash.  HEENT: moist mucous membranes, no JVD, goiter. Breathing is not labored.  She was acyanotic.  There was no peripheral edema.   The abdomen was gravid, soft, nontender with normal bowel sounds.  The liver was not palpable.  The spleen tip was not palpable.  She had a normal gait and normal strength in all extremities.  Cranial nerves II - XII are intact.  She had no clubbing, cyanosis, or edema.    A two-dimensional and Doppler fetal echocardiogram was performed today and interpreted by me at 33w6d weeks gestation.  The fetal echocardiogram showed normal segmental anatomy with no structural abnormalities found.  There is normal cardiac function.  There is no evidence of septation defect, right or left ventricular outflow obstruction or significant valvular regurgitation.  The fetal heart rate was within normal limits without ectopy or arrhythmia seen.  The spectral Doppler pattern across all valves, venous structures, and arterial structures was within normal limits.  There is no pericardial effusion.  Please see full report for details.    In summary, Marisabel Segundo is a 34 y.o. year-old  woman, currently 33w6d weeks gestation, who had a normal fetal echocardiogram at today's visit.  Therefore, we did not make any changes to her current delivery plan.  We " did not prescribe any medications.  We did not recommend intervention.      LOC: 1  Today's fetal echocardiogram demonstrated a CPAM. The heart was built normally. This is not expected to cause hemodynamic instability in the  period. No changes were made to current delivery plan.  Triage code 1: Delivery per OB at patient's preferred hospital.  Standard  care per  team.  Cardiology evaluation prior to discharge if born at [facility] or as an outpatient within 1-2 weeks if born at an outside facility.       Thank you for allowing me to participate in Marisabel's care.  If you have any further questions, please do not hesitate to contact me.     Jesse Broussard M.D.  [facility], Director  Ambulatory Pediatric Cardiology   Division of Pediatric Cardiology  [facility]  [facility]   of Pediatrics, [employer]  [facility] [address]  [place] [address]  Office:  [phone]  Fax:       [phone]  e-mail:  [email or web address]    I spent greater than 60 minutes in performance of this consultation, of which greater than 50% was related to coordination of care or counseling.

## 2024-03-20 NOTE — LETTER
2024     Lottie Mcgrath MD  [address] [facility]  [place] [address]    Patient: Marisabel Segundo   YOB: 1990   Date of Visit: 3/20/2024       Dear Dr. Lottie Mcgrath MD:    Thank you for referring Marisabel Segundo to me for evaluation. Below are my notes for this consultation.  If you have questions, please do not hesitate to call me. I look forward to following your patient along with you.       Sincerely,     Jesse Broussard MD      CC: MD Kelly Sanchez, APRN-CN, [facility]  Letitia Rodney, APRN-CNP  ______________________________________________________________________________________    Marisabel Segundo was seen at the request of Lottie Mcgrath for a chief complaint of CPAM and pregnancy as the result of IVF; a report with my findings is being sent via written or electronic means the referring physician with my recommendations for treatment.     I had the pleasure of seeing Marisabel Segundo in Pediatric Cardiology consultation at our [place] location as part of our prenatal heart program for CPAM and pregnancy as the result of IVF.  She is a 34 y.o. year-old  woman, currently 33w6d weeks gestation. Patient's last menstrual period was 2023. Estimated Date of Delivery: 24.  There have been no pregnancy complications.   She has not been hospitalized during this pregnancy.  She had a NIPT, which was normal.  She had a second trimester ultrasound which was normal.  She had an ultrasound at 30 weeks that showed a CPAM with a CVR of 0.13.    Prior to the visit, I personally reviewed the cardiac portions of the obstetrical ultrasound performed on 24.  There is normal segmental anatomy with normal 4 chamber, outflow tract and 3 vessel views.  There is no evidence of septation defect, right or left ventricular outflow obstruction or significant valvular regurgitation.    Her previous obstetrical history is without complication.  Her past medical history is  "significant for hypothyroidism and Hepatitis B. She has no history of congenital heart disease, arrhythmia, cardiomyopathy, hypercholesterolemia, hypertension, diabetes, rheumatic heart disease, cancer, asthma, lupus, Sjogren syndrome, clotting disorder, depression, anxiety, alcohol abuse, phenylketonuria, or DiGeorge.  She has had shoulder surgery.  She takes Aspirin, vitamin D, and Synthroid.  She has No Known Allergies. She is currently taking prenatal vitamins.      Her family history is negative for congenital heart disease, early atherosclerosis, sudden cardiac death, long QT syndrome, cardiomyopathy, aortic aneurysm, or genetic or metabolic disease.  RAUL's grandfather passed away from an MI at age 54.    She currently lives with her spouse and is .  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  She denies illicit drug use or alcohol abuse.  She denies verbal, sexual, or physical abuse.     Delivery Hospital: [facility]  Father of the baby's name: Danielle    /85 (BP Location: Right arm, Patient Position: Sitting)   Pulse 73   Temp 36.4 °C (97.6 °F)   Ht 1.675 m (5' 5.95\")   Wt 81.4 kg (179 lb 7.3 oz)   LMP 06/23/2023   SpO2 100%   BMI 29.01 kg/m²     She was resting comfortably in the examination room and alert, active and in no respiratory distress. Skin was without rash.  HEENT: moist mucous membranes, no JVD, goiter. Breathing is not labored.  She was acyanotic.  There was no peripheral edema.   The abdomen was gravid, soft, nontender with normal bowel sounds.  The liver was not palpable.  The spleen tip was not palpable.  She had a normal gait and normal strength in all extremities.  Cranial nerves II - XII are intact.  She had no clubbing, cyanosis, or edema.    A two-dimensional and Doppler fetal echocardiogram was performed today and interpreted by me at 33w6d weeks gestation.  The fetal echocardiogram showed normal segmental anatomy with no structural abnormalities found.  There is " normal cardiac function.  There is no evidence of septation defect, right or left ventricular outflow obstruction or significant valvular regurgitation.  The fetal heart rate was within normal limits without ectopy or arrhythmia seen.  The spectral Doppler pattern across all valves, venous structures, and arterial structures was within normal limits.  There is no pericardial effusion.  Please see full report for details.    In summary, Marisabel Segundo is a 34 y.o. year-old  woman, currently 33w6d weeks gestation, who had a normal fetal echocardiogram at today's visit.  Therefore, we did not make any changes to her current delivery plan.  We did not prescribe any medications.  We did not recommend intervention.      LOC: 1  Today's fetal echocardiogram demonstrated a CPAM. The heart was built normally. This is not expected to cause hemodynamic instability in the  period. No changes were made to current delivery plan.  Triage code 1: Delivery per OB at patient's preferred hospital.  Standard  care per  team.  Cardiology evaluation prior to discharge if born at [facility] or as an outpatient within 1-2 weeks if born at an outside facility.       Thank you for allowing me to participate in Marisabel's care.  If you have any further questions, please do not hesitate to contact me.     Jesse Broussard M.D.  Fetal [facility] Center, Director  Ambulatory Pediatric Cardiology   Division of Pediatric Cardiology  [facility]  [facility]   of Pediatrics, [employer] [employer] [facility] [employer]  [facility] [address]  [place] [address]  Office:  [phone]  Fax:       [phone]  e-mail:  [email or web address]    I spent greater than 60 minutes in performance of this consultation, of which greater than 50% was related to coordination of  care or counseling.

## 2024-03-21 NOTE — PROGRESS NOTES
Subjective   Patient ID [id number]   Marisabel Segundo is a 34 y.o.  at 34w1d.  She denies vaginal bleeding, leakage of fluid, decreased fetal movements, or contractions. We are following secondary to concern for possible fetal CPAM.       Objective   Visit Vitals  /85 (BP Location: Right arm, Patient Position: Sitting, BP Cuff Size: Adult)   Pulse 71      Physical Exam  Weight: 81.6 kg (180 lb)  BP: 131/85  Fetal Heart Rate: US      Prenatal Labs  Lab Results   Component Value Date    HGB 11.4 (L) 2024    HCT 34.7 (L) 2024    ABO AB 2023    HEPBSAG Nonreactive 2024       Imaging  US today normal EFW/HOWARD/BPP. CPAM difficult to visualized though normal cardiac axis and recent echocardiogram.     Assessment/Plan     Hypothyroidism: 75 mcg thyroid replacement. Check TSH next visit at 36 weeks.    HBV: Follows with  GI, inactive, third trimester viral load undetectable. No indication at this time for tenofovir.     Conception via IVF: For fetal monitoring after 36 weeks, will be undergoing anyhow secondary to CPAM.     A1GDM: Monitoring blood sugars and reporting to system. Currently diet controlled, sugars reviewed.    Possible CPAM: Had had FCC and fetal echo, scheduled for NICU and Peds surgery consultation. US today reassuring, CPAM not visualized. Reviewed limitations of ultrasound in this context and potential for false positive as well as limitations in determining CPAM vs sequestration prior to delivery. Follow up ultrasound in two weeks, for post-delivery evaluation. Unless indicated early by GDM or other factors would plan induction at [facility] 39+0-6 without other changes in delivery care.     Prenatal care: Had been following with midwives at [facility] and would prefer midwifery care if possible. Plan for delivery at [facility] secondary to possibility of CPAM though no plan at this time for other alternation of labor management. Will review with team, patient is aware may  risk out. In interim will schedule follow up prenatal visit and ultrasound in two weeks. GBS next visit.     Willis Narvaez MD   Maternal Fetal Medicine

## 2024-03-22 ENCOUNTER — HOSPITAL ENCOUNTER (OUTPATIENT)
Dept: RADIOLOGY | Facility: CLINIC | Age: 34
Discharge: HOME | End: 2024-03-22
Payer: COMMERCIAL

## 2024-03-22 ENCOUNTER — APPOINTMENT (OUTPATIENT)
Dept: OBSTETRICS AND GYNECOLOGY | Facility: CLINIC | Age: 34
End: 2024-03-22
Payer: COMMERCIAL

## 2024-03-22 ENCOUNTER — OFFICE VISIT (OUTPATIENT)
Dept: MATERNAL FETAL MEDICINE | Facility: CLINIC | Age: 34
End: 2024-03-22
Payer: COMMERCIAL

## 2024-03-22 VITALS
WEIGHT: 180 LBS | HEART RATE: 71 BPM | BODY MASS INDEX: 28.93 KG/M2 | HEIGHT: 66 IN | DIASTOLIC BLOOD PRESSURE: 85 MMHG | SYSTOLIC BLOOD PRESSURE: 131 MMHG

## 2024-03-22 DIAGNOSIS — Z34.01 ENCOUNTER FOR SUPERVISION OF NORMAL FIRST PREGNANCY IN FIRST TRIMESTER (HHS-HCC): ICD-10-CM

## 2024-03-22 DIAGNOSIS — O24.410 DIET CONTROLLED GESTATIONAL DIABETES MELLITUS (GDM) IN THIRD TRIMESTER (HHS-HCC): ICD-10-CM

## 2024-03-22 DIAGNOSIS — O16.3 ELEVATED BLOOD PRESSURE AFFECTING PREGNANCY IN THIRD TRIMESTER, ANTEPARTUM (HHS-HCC): ICD-10-CM

## 2024-03-22 PROBLEM — Q33.0 CONGENITAL PULMONARY AIRWAY MALFORMATION (CPAM): Status: ACTIVE | Noted: 2024-03-22

## 2024-03-22 PROCEDURE — 76816 OB US FOLLOW-UP PER FETUS: CPT | Performed by: OBSTETRICS & GYNECOLOGY

## 2024-03-22 PROCEDURE — 3075F SYST BP GE 130 - 139MM HG: CPT | Performed by: OBSTETRICS & GYNECOLOGY

## 2024-03-22 PROCEDURE — 76816 OB US FOLLOW-UP PER FETUS: CPT

## 2024-03-22 PROCEDURE — 3048F LDL-C <100 MG/DL: CPT | Performed by: OBSTETRICS & GYNECOLOGY

## 2024-03-22 PROCEDURE — 3079F DIAST BP 80-89 MM HG: CPT | Performed by: OBSTETRICS & GYNECOLOGY

## 2024-03-22 PROCEDURE — 1036F TOBACCO NON-USER: CPT | Performed by: OBSTETRICS & GYNECOLOGY

## 2024-03-22 PROCEDURE — 76819 FETAL BIOPHYS PROFIL W/O NST: CPT | Performed by: OBSTETRICS & GYNECOLOGY

## 2024-03-22 ASSESSMENT — PAIN SCALES - GENERAL: PAINLEVEL: 0-NO PAIN

## 2024-03-27 ENCOUNTER — TELEPHONE (OUTPATIENT)
Dept: RADIOLOGY | Facility: CLINIC | Age: 34
End: 2024-03-27
Payer: COMMERCIAL

## 2024-03-27 NOTE — TELEPHONE ENCOUNTER
Pt is calling because she is trying to get scheduled with the midwifes that deliver at [facility] per  but they are not able to get her in till end of April. She is wanting to speak with someone in regards to what to do next.

## 2024-03-27 NOTE — TELEPHONE ENCOUNTER
Pt. Called stating she wishes to establish care with  Midwives for downtown delivery. Pt. Was scheduled with Ashley Lang on 4/12/24 at 0930. Pt. To keep M visit on 4/5. LVM with details per pt. Request.

## 2024-03-28 ENCOUNTER — OFFICE VISIT (OUTPATIENT)
Dept: OTHER | Facility: HOSPITAL | Age: 34
End: 2024-03-28
Payer: COMMERCIAL

## 2024-03-28 ENCOUNTER — TELEMEDICINE (OUTPATIENT)
Dept: SURGERY | Facility: HOSPITAL | Age: 34
End: 2024-03-28
Payer: COMMERCIAL

## 2024-03-28 ENCOUNTER — PATIENT MESSAGE (OUTPATIENT)
Dept: MATERNAL FETAL MEDICINE | Facility: CLINIC | Age: 34
End: 2024-03-28

## 2024-03-28 DIAGNOSIS — O35.CXX1: Primary | ICD-10-CM

## 2024-03-28 DIAGNOSIS — O35.CXX0: Primary | ICD-10-CM

## 2024-03-28 PROCEDURE — 3048F LDL-C <100 MG/DL: CPT | Performed by: PEDIATRICS

## 2024-03-28 PROCEDURE — 99203 OFFICE O/P NEW LOW 30 MIN: CPT | Performed by: SURGERY

## 2024-03-28 PROCEDURE — 99213 OFFICE O/P EST LOW 20 MIN: CPT | Mod: 95 | Performed by: PEDIATRICS

## 2024-03-28 PROCEDURE — 3048F LDL-C <100 MG/DL: CPT | Performed by: SURGERY

## 2024-03-28 PROCEDURE — 99203 OFFICE O/P NEW LOW 30 MIN: CPT | Performed by: PEDIATRICS

## 2024-03-28 PROCEDURE — 3061F NEG MICROALBUMINURIA REV: CPT | Performed by: SURGERY

## 2024-03-28 NOTE — PROGRESS NOTES
"NICU PRENATAL CONSULT NOTE  Marisabel Segundo is a 34 y.o.  with JOSIAH 2024, by Embryo Transfer presenting for consultation due to the finding of a fetal cystic lung lesion.    Requesting Physician/Service:  Rigo/SAMMY  Consulting Physician/Service: Robbie/Neonatology    Reason for Consultation: Fetal cystic lung lesion    This is an IVF pregnancy.  At the 30 week ultrasound a small echogenic mass was seen within the lower right (per later ultrasound reports) lung next to the heart.  Not shifting the mediastinum.  At that time the CVR was 0.04-0.08.  On repeat ultrasound at 32 weeks the CVR was 0.13 and noted to be pyramidal in shape.  On the last ultrasound at 34 weeks, the mass was very difficult to distinguish from the lung tissue.  The blood supply has not been established.  There have been no evidence of hydrops or other anomalies.  She had a normal fetal echocardiogram.  BPP has been 8/8.    The only pregnancy complication has been gestational diabetes with a failed 3hr GTT.  Currently this is diet controlled.  She also has a history of a cleared Hep B infection.  She is Hep B surface antigen negative, surface antibody positive with negative HBV DNA titiers.  She has been seen by GI and this is thought to represent a cleared Hep B infection and will plan for repeat HBV DNA titers during the 3rd trimester.    Prenatal labs:   Lab Results   Component Value Date    ABO AB 2023    LABRH POS 2023    RUBIG POSITIVE 2023     Toxicology:   No results found for: \"AMPHETAMINE\", \"MAMPHBLDS\", \"BARBITURATE\", \"BARBSCRNUR\", \"BENZODIAZ\", \"BENZO\", \"BUPRENBLDS\", \"CANNABBLDS\", \"CANNABINOID\", \"COCBLDS\", \"COCAI\", \"METHABLDS\", \"METH\", \"OXYBLDS\", \"OXYCODONE\", \"PCPBLDS\", \"PCP\", \"OPIATBLDS\", \"OPIATE\", \"FENTANYL\", \"DRBLDCOMM\"  Labs:  Lab Results   Component Value Date    HIV1X2 NONREACTIVE 2023    HEPBSAG Nonreactive 2024    HEPCAB NONREACTIVE 2023    NEISSGONOAMP NEGATIVE 2023    " CHLAMTRACAMP NEGATIVE 09/22/2023    SYPHT NONREACTIVE 09/22/2023     Fetal Imaging:  US on 3/22/24  Vidal pregnancy. Number of fetuses: 1  Dating  ======     Conception: IVF Embryo Transfer  Embryo transfer on:     8/15/2023  IVF / ET    5 d  GA by IVF / ET    34 w + 1 d  JOSIAH by IVF / ET:  5/2/2024  GA by prior assessment  34 w + 1 d  JOSIAH by prior assessment:      5/2/2024  Ultrasound examination on:    3/22/2024  GA by U/S based upon:   AC, BPD, Femur, HC  GA by U/S   35 w + 2 d  JOSIAH by U/S: 4/24/2024  Assigned:   based on the IVF / ET date, selected on 12/7/2023  Assigned GA 34 w + 1 d  Assigned JOSIAH:     5/2/2024  Fetal Growth Overview  =================     Exam date        GA              BPD (mm)          HC (mm)              AC (mm)               FL (mm)             HL (mm)            EFW (g)  12/7/2023          19w 0d        44.6    71%         162.7    44%        131.5    34%         30.3    57%        29.4    59%         269    46%  02/22/2024        30w 0d        79.6     90%        284.2    49%        258.1     44%        55.6    18%                                  1495    38%  03/22/2024        34w 1d        90.6     97%        315    44%           294.7    34%         69.3    77%                                 2461     57%  Impression  =========     Marisabel Sanya presents for assessment of fetal growth for diet-controlled gestational diabetes and suspected BPS vs CPAM vs mixed lesion.     - Vertex, anterior placenta, normal amniotic fluid volume  - Normal interval fetal growth  - The previously measured BPS/CPAM is on the right (erroneously previously described as on the left). The area looks more and more like normal lung tissue so this lesion  may be resolving.  - BPP 8/8    Fetal Echo 3/20/24  Summary for Fetus   1. There is a prominent thymus. This may be causing an artifact in the ultrasound raising a suspicion for congenital pulmonary/airway malformation.     Normal fetal echocardiogram within  the limitations of ultrasound. No changes were made to current delivery plan. Triage code 0: Delivery per OB at patient's preferred hospital. Standard  care per  team. Cardiology consult not necessary, unless there are clinical concerns.     The measurements of the tricuspid valve, mitral valve, pulmonary valve and aortic valve are all within normal based on z-scores (Ultrasound Obstet Gynecol. 2005 Nov; 26(6):599-605).     TRIAGE CODE: 0.    Medical History  She has a past medical history of Chronic viral hepatitis B without delta-agent (CMS/HCC) (2022) and Polycystic ovarian syndrome.     Family History  Family History   Problem Relation Name Age of Onset    Hyperlipidemia Mother      Hypertension Mother      Hyperlipidemia Father      Hypertension Father      Other (brain tia) Maternal Grandmother          Social History  She reports that she has never smoked. She has never used smokeless tobacco. She reports that she does not currently use alcohol. She reports that she does not use drugs.  Lives with her  Manuel.    Assessment/Recommendations:    This is a virtual visit. All issues as below were discussed and addressed but no physical exam was performed. If it was felt that the patient should be evaluated in clinic then they were directed there. The patient verbally consented to visit.    Marisabel Segundo was seen at the request of Dr. Sierra for a chief complaint of fetal cystic lung lesion; a report with my findings is being sent via written or electronic means to Dr. Sierra with my recommendations for treatment.        Marisabel is a 35 yo  G 1 P 0 woman currently at 35 wks gestation with a male fetus who has been found to have a cystic lung lesion within the right lower lung.  It could be a bronchopulmonary sequestration given its shame on the 32 week ultrasound, however, the blood supply has not been established, thus CPAM is possible as well.   She has been monitored closely  throughout the pregnancy to assess the size and growth velocity of the mass.  The CVR peaked at 0.13 at 32 weeks gestation and currently is very difficult to see.  There has/has not been any signs of hydrops.    She has also had a normal fetal echocardiogram.    I met with Marisabel and her partner, Manuel,  in conjunction with Dr. Lawson from pediatric surgery to discuss the expected post-raj course for their son.  I explained the NICU team's presence at the delivery to assure appropriate transition, especially to evaluate his breathing.  I explained that we would not expect this lung mass to cause any difficulty with his breathing given its current size, however, we will not know 100% until he is born.  I told them that as long as he is breathing fine and looking well, he would be able to stay with her and receive normal  care in the nursery.      Dr. Lawson explained that his team would see the baby after he is born and likely recommend obtaining a chest x-ray.  If he is doing well, then they will see him in a few weeks as an outpatient and likely plan for a CT scan in ~3-6 months in preparation for removing the mass at that time.  We explained that these masses are usually removed at this time as they are not normal lung tissue and can be prone to recurrent infection and also malignant transformation later in life.    We also discussed the remote possibility that if the mass does cause respiratory distress at the time of birth, then it would have to be removed during the  stay.     I also discussed that because of her gestational diabetes, we would be monitoring his blood sugars per our guidelines after delivery.     The couple had all questions answered.  They have our contact information to reach out to us if further questions arise between now and the time of delivery.     Thank you.     Electronically signed by:  Karla Avalos MD     I spent 40 minutes for the consult with at least half  the time being face to face with the patient.

## 2024-04-01 NOTE — PROGRESS NOTES
Subjective   Marisabel Segundo is a 35 yo G1PO with JOSIAH 5/2/24, by Embryo transfer presenting today via virtual visit for consultation due to the finding of a fetal cystic lung lesion on 30 week ultrasound.     Pregnancy complication includes gestational diabetes that has been well controlled with diet only.     Past Medical History:   Diagnosis Date    Chronic viral hepatitis B without delta-agent (CMS/HCC) 06/23/2022    Chronic hepatitis B    Polycystic ovarian syndrome     PCOS (polycystic ovarian syndrome)      Past surgical history includes   Past Surgical History:   Procedure Laterality Date    OTHER SURGICAL HISTORY  08/31/2021    Shoulder surgery      Current Outpatient Medications   Medication Sig Dispense Refill    aspirin 81 mg EC tablet Take 2 tablets (162 mg) by mouth once daily.      BD Alcohol Swabs pads, medicated Apply 1 Pad topically 4 times a day. 90 each 3    Blood glucose monitoring meter kit kit 1 each if needed (4 times daily). 1 each 0    blood sugar diagnostic strip 1 strip 4 times a day. 120 strip 4    cholecalciferol (Vitamin D-3) 5,000 Units tablet Take 1 tablet (5,000 Units) by mouth once daily.      lancets misc 1 strip 4 times a day. 120 each 4    levothyroxine (Synthroid) 50 mcg tablet Take 1 tablet (50 mcg) by mouth once daily in the morning. Take before meals. 30 tablet 11    prenatal no115/iron/folic acid (PRENATAL 19 ORAL) Take 1 tablet by mouth once daily.       No current facility-administered medications for this visit.      No Known Allergies   Family History   Problem Relation Name Age of Onset    Hyperlipidemia Mother      Hypertension Mother      Hyperlipidemia Father      Hypertension Father      Other (brain tia) Maternal Grandmother            Assessment/Plan   Marisabel Segundo is a 35 yo female, G1PO with JOSIAH 5/2/24 by Embryo transfer presenting today via virtual visit for consultation due to the finding of a fetal cystic lung lesion on 30 week ultrasound. The mass was difficult to  find on the most most recent ultrasound at 34 weeks.     PLAN  -Discussed with parents that the Pediatric surgery team will be notified once baby boy born and will see him shortly after birth and likely obtain chest x-ray at that time. If clinically doing well will follow up outpatient with plans do to a CT in 3-6 months for operative planning purposes.   -Surgical resection is recommended as if left alone can be prone to recurrent infections and/or malignancy  -Please call with any further questions or concerns

## 2024-04-03 ENCOUNTER — PATIENT MESSAGE (OUTPATIENT)
Dept: MATERNAL FETAL MEDICINE | Facility: CLINIC | Age: 34
End: 2024-04-03
Payer: COMMERCIAL

## 2024-04-04 ENCOUNTER — HOSPITAL ENCOUNTER (OUTPATIENT)
Dept: RADIOLOGY | Facility: CLINIC | Age: 34
Discharge: HOME | End: 2024-04-04
Payer: COMMERCIAL

## 2024-04-04 DIAGNOSIS — O24.410 DIET CONTROLLED GESTATIONAL DIABETES MELLITUS (GDM) IN THIRD TRIMESTER (HHS-HCC): ICD-10-CM

## 2024-04-04 PROCEDURE — 76819 FETAL BIOPHYS PROFIL W/O NST: CPT | Performed by: STUDENT IN AN ORGANIZED HEALTH CARE EDUCATION/TRAINING PROGRAM

## 2024-04-04 PROCEDURE — 76819 FETAL BIOPHYS PROFIL W/O NST: CPT

## 2024-04-04 NOTE — PROGRESS NOTES
Subjective   Patient ID [id number]   Marisabel Segundo is a 34 y.o.  at 36w1d.  She denies vaginal bleeding, leakage of fluid, decreased fetal movements, or contractions. She is followed for A1GDM and possible fetal CPAM.     Objective   Visit Vitals  /83 (BP Location: Right arm, Patient Position: Sitting, BP Cuff Size: Adult)   Pulse 64      Physical Exam  Weight: 83 kg (183 lb)  BP: 131/83         Prenatal Labs    Lab Results   Component Value Date    HGB 11.4 (L) 2024    HCT 34.7 (L) 2024    ABO AB 2023    HEPBSAG Nonreactive 2024       Imaging  US  with stable CVR    Assessment/Plan     Hypothyroidism: 75 mcg thyroid replacement. Repeat TSH today.     HBV: Follows with [facility], inactive, third trimester viral load undetectable. No indication at this time for tenofovir.      Finger numbness upon awakening, possible carpel tunnel: Reviewed wrist splints and conservative measures.     Conception via IVF: For fetal monitoring after 36 weeks.      A1GDM: Monitoring blood sugars and reporting to system. Currently diet controlled, sugars reviewed today.      Possible CPAM: Had had FCC and fetal echo, has had NICU and pediatric surgery consultation. Aware of potential for false positive. Follow up ultrasound in two weeks then plan delivery for 39+0-6. No needed alteration in labor management other than delivery on main campus.      Single BP elevation: Normotensive today, no symptoms. Will obtain previously ordered labs.     Prenatal care: Had been following with CNM prior to CPAM diagnosis and desires CNM care, including with delivery. Scheduled for assumption of PNC. For NST next week then US week after, will continue to report blood sugars to our system.      Willis Narvaez MD   Maternal Fetal Medicine

## 2024-04-05 ENCOUNTER — OFFICE VISIT (OUTPATIENT)
Dept: MATERNAL FETAL MEDICINE | Facility: CLINIC | Age: 34
End: 2024-04-05
Payer: COMMERCIAL

## 2024-04-05 VITALS
DIASTOLIC BLOOD PRESSURE: 83 MMHG | BODY MASS INDEX: 29.41 KG/M2 | SYSTOLIC BLOOD PRESSURE: 131 MMHG | HEART RATE: 64 BPM | HEIGHT: 66 IN | WEIGHT: 183 LBS

## 2024-04-05 DIAGNOSIS — E03.8 SUBCLINICAL HYPOTHYROIDISM: Primary | ICD-10-CM

## 2024-04-05 DIAGNOSIS — O16.3 ELEVATED BLOOD PRESSURE AFFECTING PREGNANCY IN THIRD TRIMESTER, ANTEPARTUM (HHS-HCC): ICD-10-CM

## 2024-04-05 DIAGNOSIS — O24.410 DIET CONTROLLED GESTATIONAL DIABETES MELLITUS (GDM) IN THIRD TRIMESTER (HHS-HCC): ICD-10-CM

## 2024-04-05 DIAGNOSIS — O35.CXX1: ICD-10-CM

## 2024-04-05 DIAGNOSIS — Z34.01 ENCOUNTER FOR SUPERVISION OF NORMAL FIRST PREGNANCY IN FIRST TRIMESTER (HHS-HCC): ICD-10-CM

## 2024-04-05 LAB
ALBUMIN SERPL BCP-MCNC: 3.5 G/DL (ref 3.4–5)
ALP SERPL-CCNC: 164 U/L (ref 33–110)
ALT SERPL W P-5'-P-CCNC: 13 U/L (ref 7–45)
ANION GAP SERPL CALC-SCNC: 17 MMOL/L (ref 10–20)
AST SERPL W P-5'-P-CCNC: 18 U/L (ref 9–39)
BILIRUB SERPL-MCNC: 0.4 MG/DL (ref 0–1.2)
BUN SERPL-MCNC: 9 MG/DL (ref 6–23)
CALCIUM SERPL-MCNC: 10.2 MG/DL (ref 8.6–10.6)
CHLORIDE SERPL-SCNC: 104 MMOL/L (ref 98–107)
CO2 SERPL-SCNC: 22 MMOL/L (ref 21–32)
CREAT SERPL-MCNC: 0.62 MG/DL (ref 0.5–1.05)
EGFRCR SERPLBLD CKD-EPI 2021: >90 ML/MIN/1.73M*2
ERYTHROCYTE [DISTWIDTH] IN BLOOD BY AUTOMATED COUNT: 14.9 % (ref 11.5–14.5)
GLUCOSE SERPL-MCNC: 84 MG/DL (ref 74–99)
HCT VFR BLD AUTO: 41.6 % (ref 36–46)
HGB BLD-MCNC: 13.5 G/DL (ref 12–16)
MCH RBC QN AUTO: 31.3 PG (ref 26–34)
MCHC RBC AUTO-ENTMCNC: 32.5 G/DL (ref 32–36)
MCV RBC AUTO: 96 FL (ref 80–100)
NRBC BLD-RTO: 0 /100 WBCS (ref 0–0)
PLATELET # BLD AUTO: 175 X10*3/UL (ref 150–450)
POTASSIUM SERPL-SCNC: 4 MMOL/L (ref 3.5–5.3)
PROT SERPL-MCNC: 6.4 G/DL (ref 6.4–8.2)
RBC # BLD AUTO: 4.32 X10*6/UL (ref 4–5.2)
SODIUM SERPL-SCNC: 139 MMOL/L (ref 136–145)
TSH SERPL-ACNC: 1.2 MIU/L (ref 0.44–3.98)
WBC # BLD AUTO: 9.6 X10*3/UL (ref 4.4–11.3)

## 2024-04-05 PROCEDURE — 3048F LDL-C <100 MG/DL: CPT | Performed by: OBSTETRICS & GYNECOLOGY

## 2024-04-05 PROCEDURE — 3075F SYST BP GE 130 - 139MM HG: CPT | Performed by: OBSTETRICS & GYNECOLOGY

## 2024-04-05 PROCEDURE — 36415 COLL VENOUS BLD VENIPUNCTURE: CPT | Performed by: OBSTETRICS & GYNECOLOGY

## 2024-04-05 PROCEDURE — 87081 CULTURE SCREEN ONLY: CPT | Performed by: OBSTETRICS & GYNECOLOGY

## 2024-04-05 PROCEDURE — 84443 ASSAY THYROID STIM HORMONE: CPT | Performed by: OBSTETRICS & GYNECOLOGY

## 2024-04-05 PROCEDURE — 85027 COMPLETE CBC AUTOMATED: CPT | Performed by: OBSTETRICS & GYNECOLOGY

## 2024-04-05 PROCEDURE — 80053 COMPREHEN METABOLIC PANEL: CPT | Performed by: OBSTETRICS & GYNECOLOGY

## 2024-04-05 PROCEDURE — 3061F NEG MICROALBUMINURIA REV: CPT | Performed by: OBSTETRICS & GYNECOLOGY

## 2024-04-05 PROCEDURE — 82570 ASSAY OF URINE CREATININE: CPT | Performed by: OBSTETRICS & GYNECOLOGY

## 2024-04-05 PROCEDURE — 3079F DIAST BP 80-89 MM HG: CPT | Performed by: OBSTETRICS & GYNECOLOGY

## 2024-04-06 PROBLEM — O35.CXX0: Status: ACTIVE | Noted: 2024-02-23

## 2024-04-06 LAB
CREAT UR-MCNC: 28.5 MG/DL (ref 20–320)
PROT UR-ACNC: 5 MG/DL (ref 5–24)
PROT/CREAT UR: 0.18 MG/MG CREAT (ref 0–0.17)

## 2024-04-08 LAB — GP B STREP GENITAL QL CULT: ABNORMAL

## 2024-04-10 ENCOUNTER — TELEPHONE (OUTPATIENT)
Dept: RADIOLOGY | Facility: CLINIC | Age: 34
End: 2024-04-10
Payer: COMMERCIAL

## 2024-04-10 NOTE — TELEPHONE ENCOUNTER
Confirmed with pt that she is gbs + and will be treated with antibiotics when admitted for delivery.  Also confirming that her pec labs were wnl.  Pt. Has Lynda number to call for any further questions.

## 2024-04-12 ENCOUNTER — ROUTINE PRENATAL (OUTPATIENT)
Dept: OBSTETRICS AND GYNECOLOGY | Facility: CLINIC | Age: 34
End: 2024-04-12
Payer: COMMERCIAL

## 2024-04-12 VITALS — DIASTOLIC BLOOD PRESSURE: 70 MMHG | BODY MASS INDEX: 29.86 KG/M2 | SYSTOLIC BLOOD PRESSURE: 120 MMHG | WEIGHT: 185 LBS

## 2024-04-12 DIAGNOSIS — Z86.19 HISTORY OF HEPATITIS B VIRUS INFECTION: ICD-10-CM

## 2024-04-12 DIAGNOSIS — N97.9 FEMALE INFERTILITY: ICD-10-CM

## 2024-04-12 DIAGNOSIS — R87.610 ASCUS OF CERVIX WITH NEGATIVE HIGH RISK HPV: ICD-10-CM

## 2024-04-12 DIAGNOSIS — Z34.01 ENCOUNTER FOR SUPERVISION OF NORMAL FIRST PREGNANCY IN FIRST TRIMESTER (HHS-HCC): ICD-10-CM

## 2024-04-12 DIAGNOSIS — Z36.89 NON-STRESS TEST REACTIVE (HHS-HCC): ICD-10-CM

## 2024-04-12 DIAGNOSIS — Z78.9 CONCEIVED BY IN VITRO FERTILIZATION: ICD-10-CM

## 2024-04-12 DIAGNOSIS — E03.8 SUBCLINICAL HYPOTHYROIDISM: ICD-10-CM

## 2024-04-12 DIAGNOSIS — O24.410 DIET CONTROLLED GESTATIONAL DIABETES MELLITUS (GDM) IN THIRD TRIMESTER (HHS-HCC): ICD-10-CM

## 2024-04-12 DIAGNOSIS — O16.3 ELEVATED BLOOD PRESSURE AFFECTING PREGNANCY IN THIRD TRIMESTER, ANTEPARTUM (HHS-HCC): Primary | ICD-10-CM

## 2024-04-12 DIAGNOSIS — Q33.0 CONGENITAL PULMONARY AIRWAY MALFORMATION (CPAM): ICD-10-CM

## 2024-04-12 DIAGNOSIS — O35.CXX0: ICD-10-CM

## 2024-04-12 DIAGNOSIS — Z3A.37 PREGNANCY WITH 37 WEEKS COMPLETED GESTATION (HHS-HCC): ICD-10-CM

## 2024-04-12 PROCEDURE — 0501F PRENATAL FLOW SHEET: CPT

## 2024-04-12 PROCEDURE — 59025 FETAL NON-STRESS TEST: CPT

## 2024-04-12 NOTE — PROGRESS NOTES
Assessment/Plan   Diagnoses and all orders for this visit:  Elevated blood pressure affecting pregnancy in third trimester, antepartum ([facility])  Subclinical hypothyroidism  History of hepatitis B virus infection  ASCUS of cervix with negative high risk HPV  Female infertility  Encounter for supervision of normal first pregnancy in first trimester ([facility])  Pregnancy complicated by fetal lung lesion, single or unspecified fetus ([facility])  Congenital pulmonary airway malformation (CPAM)  Conceived by in vitro fertilization  -     Fetal nonstress test, once; Future  Non-stress test reactive ([facility])  Diet controlled gestational diabetes mellitus (GDM) in third trimester ([facility])  Pregnancy with 37 weeks completed gestation ([facility])       surveillance weekly for history of IVF pregnancy.   Discussed expectant management vs. scheduling term IOL - she desired 39 week IOL   Discussed expectations and methods used for IOL  Reviewed s/sx of labor, warning signs, fetal movement counts, and when to call provider  Follow up in 1 week for a routine prenatal visit.    Sugars:  Fasting 70-83  Breaskfast -  (153 x1)  Lunch -  (148, 161)  Dinner  -  (143)       DARNELL Govea-JUSTYNA Whiting     Marisabel Segundo is a 34 y.o.  at 37w1d with a working estimated date of delivery of 2024, by Embryo Transfer who presents for a routine prenatal visit. She denies vaginal bleeding, leakage of fluid, decreased fetal movements, or contractions.    Met patient for the first time today. She is a transfer from [place] and desires [facility] care at [facility]. She has been monitored for GDMA1 and fetal CMPAM by M during her pregnancy. She has a documented plan of care for delivery.     She has been checking her sugar at home, also reviewed with M this week. See above.     Her pregnancy is complicated by:  Problem List Items Addressed This Visit       ASCUS of cervix with negative high risk HPV    Overview     -Last  "pap ASCUS/HRHPV neg in 08/2021  -Repeat cotest due in 08/2024         Conceived by in vitro fertilization    Overview     Weekly NSTs at 36 weeks          Relevant Orders    Fetal nonstress test, once    Congenital pulmonary airway malformation (CPAM)    Overview     -Last CVR 0.13 (3/7/24)  -s/p fetal echo that re-demonstrated CPAM but was otherwise normal, triage code 1  -s/p MFM consult  -Scheduled for NICU and peds surg consults    Per MFM note @ 36 weeks; \"Possible CPAM: Had had FCC and fetal echo, has had NICU and pediatric surgery consultation. Aware of potential for false positive. Follow up ultrasound in two weeks then plan delivery for 39+0-6. No needed alteration in labor management other than delivery on main campus.\"    Triage code 1          Diet controlled gestational diabetes mellitus (GDM) in third trimester ([facility])    Overview     4/15/2024 : nutrition           Elevated blood pressure affecting pregnancy in third trimester, antepartum ([facility]) - Primary    Overview     -/90 at 32wga         Encounter for supervision of normal first pregnancy in first trimester ([facility])    Overview       Dating:   [x] Initial BMI: 24  [x] Prenatal Labs:   [x] Genetic Screening:  -s/p risk-reducing cfDNA, normal NT, and normal anatomy  [x] Baby ASA:  [x] Anatomy US:  [x] 1hr GCT at 24-28wks:  [x] Tdap (27-36wks):  [x] Flu Shot: 9/2023  [x] COVID vaccine:   [x] Rhogam (if Rh neg): not indicated   [x] GBS at 36 wks: positive  [] Breastfeeding  [] Postpartum Birth control method:   [x] 39 weeks discussion of IOL vs. Expectant management: [place] [facility] at 39.6   [x] Mode of delivery: vaginal          Female infertility    Overview     IVF pregnancy: plan serial growth, weekly NSTs at 36 wks and delivery 39-39.6         History of hepatitis B virus infection    Overview     -Suspect HBV inactive (prior infection, now cleared) based on positive Ab to core with neg surface Ag and negative viral " load  -Follows with Dr. Santiago (GI), last visit 1/17/24  -For repeat viral load in Feb and March -> if above 10k, consider tenofovir   -Per peds and peds ID, infant does NOT require HBIG postpartum  -No change in delivery plan or labor management         Pregnancy complicated by fetal lung lesion ([facility])    Overview     Growth US at 32 and 36 weeks.   MFM referral placed  Nutrition counseling scheduled    3/14/2024 : nutrition only  3/28/2024 Nutrition plan alone   4/3/2024 : Nutrition plan         Subclinical hypothyroidism    Overview     -TSH 0.3 (10/10) -> synthroid decreased from 75mcg to 50mcg -> TSH 0.61 (11/14)  -Second trimester TSH 1.01   - third trimester 4/5 THS 1.20, taking syntroid 75mcg as of 4/10 per 36 week note           Other Visit Diagnoses       Non-stress test reactive ([facility])                 Objective   Physical Exam:   Weight: 83.9 kg (185 lb)  TWG: 15 kg (33 lb)  Expected Total Weight Gain: 11.5 kg (25 lb)-16 kg (35 lb)   Pregravid BMI: 24.55  BP: 120/70  Fetal Heart Rate: 158               Postpartum Depression: Not on file        Prenatal Labs  Lab Results   Component Value Date    HGB 13.5 04/05/2024    HCT 41.6 04/05/2024     04/05/2024    ABO AB 09/22/2023    LABRH POS 09/22/2023    NEISSGONOAMP NEGATIVE 09/22/2023    CHLAMTRACAMP NEGATIVE 09/22/2023    SYPHT NONREACTIVE 09/22/2023    HEPBSAG Nonreactive 01/05/2024    HIV1X2 NONREACTIVE 09/22/2023    URINECULTURE MIXED URETHRAL NIKKI. 09/22/2023     Lab Results   Component Value Date    GLUC1P 179 (H) 02/08/2024     Group B Strep Screen   Date Value Ref Range Status   04/05/2024 (A)  Corrected    Isolated: Streptococcus agalactiae (Group B Streptococcus)     Comment:     This is an appended report. These results have been appended to a previously final verified report.        Non-Stress Test   Baseline Fetal Heart Rate for Non-Stress Test: 135 BPM  Variability in Waveform for Non-Stress Test: Moderate  Accelerations in  Non-Stress Test: Yes, lasting at least 15 seconds, greater than/equal to 15 bpm  Decelerations in Non-Stress Test: None  Contractions in Non-Stress Test:  (present x1)  NST Contraction Frequency: 1  Acoustic Stimulator for Non-Stress Test: Yes  Interpretation of Non-Stress Test   Interpretation of Non-Stress Test: Reactive

## 2024-04-15 ENCOUNTER — TELEPHONE (OUTPATIENT)
Dept: OBSTETRICS AND GYNECOLOGY | Facility: CLINIC | Age: 34
End: 2024-04-15
Payer: COMMERCIAL

## 2024-04-15 ENCOUNTER — TELEPHONE (OUTPATIENT)
Dept: OBSTETRICS AND GYNECOLOGY | Facility: CLINIC | Age: 34
End: 2024-04-15

## 2024-04-15 ENCOUNTER — PATIENT MESSAGE (OUTPATIENT)
Dept: MATERNAL FETAL MEDICINE | Facility: CLINIC | Age: 34
End: 2024-04-15
Payer: COMMERCIAL

## 2024-04-15 PROBLEM — O24.410 DIET CONTROLLED GESTATIONAL DIABETES MELLITUS (GDM) IN THIRD TRIMESTER (HHS-HCC): Status: ACTIVE | Noted: 2024-04-15

## 2024-04-15 NOTE — TELEPHONE ENCOUNTER
Pt returned call to office.   Pt saw CNM on friday and asking about IOL dates.   Plan is to deliver between 39.0 and 39.6 for well controlled diabetes.    Pt requests it be scheduled on 5/1/24 @ 39.6 weeks, if possible.     Pt has US scheduled this thurs.  Nurse will make kenya Lang aware.

## 2024-04-15 NOTE — TELEPHONE ENCOUNTER
Attempted to call pt back to further discuss her message.   Got her voice mail.    Message left to call office.

## 2024-04-17 NOTE — TELEPHONE ENCOUNTER
scheduling office contacted.   S/w radha.   Iol has been scheduled for wed. 5/1/24 at 11am per CNM.   Pt will be 39.6 weeks that day.   Pt to be contacted with date/time.

## 2024-04-18 ENCOUNTER — HOSPITAL ENCOUNTER (OUTPATIENT)
Dept: RADIOLOGY | Facility: CLINIC | Age: 34
Discharge: HOME | End: 2024-04-18
Payer: COMMERCIAL

## 2024-04-18 DIAGNOSIS — O24.410 DIET CONTROLLED GESTATIONAL DIABETES MELLITUS (GDM) IN THIRD TRIMESTER (HHS-HCC): ICD-10-CM

## 2024-04-18 PROCEDURE — 76819 FETAL BIOPHYS PROFIL W/O NST: CPT | Performed by: MEDICAL GENETICS

## 2024-04-18 PROCEDURE — 76816 OB US FOLLOW-UP PER FETUS: CPT

## 2024-04-18 PROCEDURE — 76819 FETAL BIOPHYS PROFIL W/O NST: CPT

## 2024-04-18 PROCEDURE — 76816 OB US FOLLOW-UP PER FETUS: CPT | Performed by: MEDICAL GENETICS

## 2024-04-19 ENCOUNTER — ROUTINE PRENATAL (OUTPATIENT)
Dept: OBSTETRICS AND GYNECOLOGY | Facility: CLINIC | Age: 34
End: 2024-04-19
Payer: COMMERCIAL

## 2024-04-19 VITALS — DIASTOLIC BLOOD PRESSURE: 86 MMHG | BODY MASS INDEX: 29.86 KG/M2 | WEIGHT: 185 LBS | SYSTOLIC BLOOD PRESSURE: 124 MMHG

## 2024-04-19 DIAGNOSIS — Z3A.38 PREGNANCY WITH 38 COMPLETED WEEKS GESTATION (HHS-HCC): ICD-10-CM

## 2024-04-19 DIAGNOSIS — Z78.9 CONCEIVED BY IN VITRO FERTILIZATION: ICD-10-CM

## 2024-04-19 DIAGNOSIS — Z36.89 NON-STRESS TEST REACTIVE (HHS-HCC): ICD-10-CM

## 2024-04-19 DIAGNOSIS — Z34.90 ENCOUNTER FOR INDUCTION OF LABOR (HHS-HCC): ICD-10-CM

## 2024-04-19 DIAGNOSIS — Z34.01 ENCOUNTER FOR SUPERVISION OF NORMAL FIRST PREGNANCY IN FIRST TRIMESTER (HHS-HCC): ICD-10-CM

## 2024-04-19 DIAGNOSIS — O35.CXX0: ICD-10-CM

## 2024-04-19 DIAGNOSIS — O24.410 DIET CONTROLLED GESTATIONAL DIABETES MELLITUS (GDM) IN THIRD TRIMESTER (HHS-HCC): ICD-10-CM

## 2024-04-19 DIAGNOSIS — E03.8 SUBCLINICAL HYPOTHYROIDISM: ICD-10-CM

## 2024-04-19 DIAGNOSIS — R87.610 ASCUS OF CERVIX WITH NEGATIVE HIGH RISK HPV: ICD-10-CM

## 2024-04-19 DIAGNOSIS — Q33.0 CONGENITAL PULMONARY AIRWAY MALFORMATION (CPAM): ICD-10-CM

## 2024-04-19 DIAGNOSIS — Z86.19 HISTORY OF HEPATITIS B VIRUS INFECTION: ICD-10-CM

## 2024-04-19 DIAGNOSIS — N97.9 FEMALE INFERTILITY: ICD-10-CM

## 2024-04-19 DIAGNOSIS — O16.3 ELEVATED BLOOD PRESSURE AFFECTING PREGNANCY IN THIRD TRIMESTER, ANTEPARTUM (HHS-HCC): Primary | ICD-10-CM

## 2024-04-19 PROCEDURE — 0501F PRENATAL FLOW SHEET: CPT

## 2024-04-19 NOTE — PROGRESS NOTES
Assessment/Plan   Diagnoses and all orders for this visit:  Elevated blood pressure affecting pregnancy in third trimester, antepartum ([facility])  Diet controlled gestational diabetes mellitus (GDM) in third trimester ([facility])  -     Labor Induction; Future  Subclinical hypothyroidism  History of hepatitis B virus infection  ASCUS of cervix with negative high risk HPV  Female infertility  Encounter for supervision of normal first pregnancy in first trimester ([facility])  Pregnancy complicated by fetal lung lesion, single or unspecified fetus ([facility])  Congenital pulmonary airway malformation (CPAM)  Pregnancy with 38 completed weeks gestation ([facility])  Conceived by in vitro fertilization  -     Labor Induction; Future  Encounter for induction of labor ([facility])  -     Labor Induction; Future  Non-stress test reactive ([facility])       surveillance weekly for IVF pregnancy   Discussed expectant management vs. scheduling term IOL, pt scheduled   Discussed expectations and methods used for IOL  Reviewed s/sx of labor, warning signs, fetal movement counts, and when to call provider  Follow up in 1 week for a routine prenatal visit.    NST not indicated today for history of IVF pregnancy since she has BPP yesterday  - reviewed with pt     Reviewed GBS positive status     Pt has been checking blood sugar at home  - she is established with [facility] for mgt . She will email them to me today. Reports they have been normal with an occasional outlier       Ashley Lang, DARNELL-ANNITA    Subjective     Marisabel Segundo is a 34 y.o.  at 38w1d with a working estimated date of delivery of 2024, by Embryo Transfer who presents for a routine prenatal visit. She denies vaginal bleeding, leakage of fluid, decreased fetal movements, or contractions.    Her pregnancy is complicated by:  Problem List Items Addressed This Visit       ASCUS of cervix with negative high risk HPV    Overview     -Last pap ASCUS/HRHPV neg in 2021  -Repeat  "cotest due in 08/2024         Conceived by in vitro fertilization    Overview     Weekly NSTs at 36 weeks          Relevant Orders    Labor Induction    Congenital pulmonary airway malformation (CPAM)    Overview     -Last CVR 0.13 (3/7/24)  -s/p fetal echo that re-demonstrated CPAM but was otherwise normal, triage code 1  -s/p MFM consult  -Scheduled for NICU and peds surg consults    Per MFM note @ 36 weeks; \"Possible CPAM: Had had FCC and fetal echo, has had NICU and pediatric surgery consultation. Aware of potential for false positive. Follow up ultrasound in two weeks then plan delivery for 39+0-6. No needed alteration in labor management other than delivery on main campus.\"    Triage code 1          Diet controlled gestational diabetes mellitus (GDM) in third trimester ([facility])    Overview     4/15/2024 : nutrition           Relevant Orders    Labor Induction    Elevated blood pressure affecting pregnancy in third trimester, antepartum ([facility]) - Primary    Overview     -/90 at 32wga         Encounter for supervision of normal first pregnancy in first trimester ([facility])    Overview       Dating:   [x] Initial BMI: 24  [x] Prenatal Labs:   [x] Genetic Screening:  -s/p risk-reducing cfDNA, normal NT, and normal anatomy  [x] Baby ASA:  [x] Anatomy US:  [x] 1hr GCT at 24-28wks:  [x] Tdap (27-36wks):  [x] Flu Shot: 9/2023  [x] COVID vaccine:   [x] Rhogam (if Rh neg): not indicated   [x] GBS at 36 wks: positive  [] Breastfeeding  [] Postpartum Birth control method:   [x] 39 weeks discussion of IOL vs. Expectant management: [place] [facility] [place].6   [x] Mode of delivery: vaginal          Female infertility    Overview     IVF pregnancy: plan serial growth, weekly NSTs at 36 wks and delivery 39-39.6         History of hepatitis B virus infection    Overview     -Suspect HBV inactive (prior infection, now cleared) based on positive Ab to core with neg surface Ag and negative viral load  -Follows with " Post (GI), last visit 1/17/24  -For repeat viral load in Feb and March -> if above 10k, consider tenofovir   -Per peds and peds ID, infant does NOT require HBIG postpartum  -No change in delivery plan or labor management         Pregnancy complicated by fetal lung lesion ([facility])    Overview     Growth US at 32 and 36 weeks.   MFM referral placed  Nutrition counseling scheduled    3/14/2024 : nutrition only  3/28/2024 Nutrition plan alone   4/3/2024 : Nutrition plan         Subclinical hypothyroidism    Overview     -TSH 0.3 (10/10) -> synthroid decreased from 75mcg to 50mcg -> TSH 0.61 (11/14)  -Second trimester TSH 1.01   - third trimester 4/5 THS 1.20, taking syntroid 75mcg as of 4/10 per 36 week note           Other Visit Diagnoses       Pregnancy with 38 completed weeks gestation ([facility])        Encounter for induction of labor ([facility])        Relevant Orders    Labor Induction    Non-stress test reactive ([facility])                 Objective   Physical Exam:   Weight: 83.9 kg (185 lb)  TWG: 15 kg (33 lb)  Expected Total Weight Gain: 11.5 kg (25 lb)-16 kg (35 lb)   Pregravid BMI: 24.55  BP: 124/86  Fetal Heart Rate: 131 Fundal Height (cm): 39 cm Presentation: Vertex  Dilation: 2 Effacement (%): 70 Fetal Station: -2    Postpartum Depression: Not on file        Prenatal Labs  Lab Results   Component Value Date    HGB 13.5 04/05/2024    HCT 41.6 04/05/2024     04/05/2024    ABO AB 09/22/2023    LABRH POS 09/22/2023    NEISSGONOAMP NEGATIVE 09/22/2023    CHLAMTRACAMP NEGATIVE 09/22/2023    SYPHT NONREACTIVE 09/22/2023    HEPBSAG Nonreactive 01/05/2024    HIV1X2 NONREACTIVE 09/22/2023    URINECULTURE MIXED URETHRAL NIKKI. 09/22/2023     Lab Results   Component Value Date    GLUC1P 179 (H) 02/08/2024     Group B Strep Screen   Date Value Ref Range Status   04/05/2024 (A)  Corrected    Isolated: Streptococcus agalactiae (Group B Streptococcus)     Comment:     This is an appended report. These results have  been appended to a previously final verified report.        Imaging  The most recent ultrasound was performed yesterday -    -Normal interval fetal growth - 49th%  -BPP Score is 8/8  -The previously visualized right-sided chest mass not seen today. Right chest area appears to contain typical-appearing lung tissue. This is consistent with the fetal  echocardiogram results.

## 2024-04-26 ENCOUNTER — TELEPHONE (OUTPATIENT)
Dept: MATERNAL FETAL MEDICINE | Facility: HOSPITAL | Age: 34
End: 2024-04-26

## 2024-04-26 ENCOUNTER — APPOINTMENT (OUTPATIENT)
Dept: OBSTETRICS AND GYNECOLOGY | Facility: CLINIC | Age: 34
End: 2024-04-26
Payer: COMMERCIAL

## 2024-04-26 ENCOUNTER — ROUTINE PRENATAL (OUTPATIENT)
Dept: OBSTETRICS AND GYNECOLOGY | Facility: CLINIC | Age: 34
End: 2024-04-26
Payer: COMMERCIAL

## 2024-04-26 VITALS — DIASTOLIC BLOOD PRESSURE: 82 MMHG | BODY MASS INDEX: 30.51 KG/M2 | SYSTOLIC BLOOD PRESSURE: 120 MMHG | WEIGHT: 189 LBS

## 2024-04-26 DIAGNOSIS — Z36.89 NON-STRESS TEST REACTIVE (HHS-HCC): ICD-10-CM

## 2024-04-26 DIAGNOSIS — E03.8 SUBCLINICAL HYPOTHYROIDISM: ICD-10-CM

## 2024-04-26 DIAGNOSIS — O35.CXX0: ICD-10-CM

## 2024-04-26 DIAGNOSIS — O24.410 DIET CONTROLLED GESTATIONAL DIABETES MELLITUS (GDM) IN THIRD TRIMESTER (HHS-HCC): ICD-10-CM

## 2024-04-26 DIAGNOSIS — Z78.9 CONCEIVED BY IN VITRO FERTILIZATION: ICD-10-CM

## 2024-04-26 DIAGNOSIS — O16.3 ELEVATED BLOOD PRESSURE AFFECTING PREGNANCY IN THIRD TRIMESTER, ANTEPARTUM (HHS-HCC): Primary | ICD-10-CM

## 2024-04-26 DIAGNOSIS — Q33.0 CONGENITAL PULMONARY AIRWAY MALFORMATION (CPAM): ICD-10-CM

## 2024-04-26 DIAGNOSIS — R87.610 ASCUS OF CERVIX WITH NEGATIVE HIGH RISK HPV: ICD-10-CM

## 2024-04-26 DIAGNOSIS — Z34.01 ENCOUNTER FOR SUPERVISION OF NORMAL FIRST PREGNANCY IN FIRST TRIMESTER (HHS-HCC): ICD-10-CM

## 2024-04-26 DIAGNOSIS — Z86.19 HISTORY OF HEPATITIS B VIRUS INFECTION: ICD-10-CM

## 2024-04-26 DIAGNOSIS — N97.9 FEMALE INFERTILITY: ICD-10-CM

## 2024-04-26 PROCEDURE — 0501F PRENATAL FLOW SHEET: CPT

## 2024-04-26 PROCEDURE — 59025 FETAL NON-STRESS TEST: CPT

## 2024-04-26 NOTE — TELEPHONE ENCOUNTER
Blood Sugar Support Line Communication   Communicated with the patient on 4/26/2024   She has Gestational Diabetes 39w1d    At the time of the call her diabetes was treated with:  Nutrition plan alone     The patient checks her sugars and reports them as follows:      The patient's regimen was changed to:   fasting blood sugar appear lower end of normal.  Marisabel Segundo is encouraged to ensure snack At Bedtime  especially if feeling mild low symptoms.  Encouraged to contact the Blood Sugar Support Line questions or concerns        Patient understands to submit sugar log for review weekly through the Blood Sugar Line @ [phone] or via email to [email or web address] to help optimize glucose control.    A voice mail message was left at the contact number provided by the patient.        Case Management Assessment & Discharge Planning Note    Patient name Jamee Almonte  Location /009-75 MRN [id number]  : 1935 Date 2023       Current Admission Date: 2023  Current Admission Diagnosis:Acute on chronic diastolic (congestive) heart failure [facility]   Patient Active Problem List    Diagnosis Date Noted   • Acute on chronic diastolic (congestive) heart failure ([address]) 2023   • Troponin level elevated 2023   • Anemia 2023   • Plantar fascial fibromatosis 2023   • Diarrhea of presumed infectious origin 2023   • Cryptogenic organizing pneumonia ([address]) 2023   • Type 2 diabetes mellitus without complication, without long-term current use of insulin ([address]) 2023   • Peripheral arterial disease ([address]) 2023   • Hypertensive heart disease with heart failure ([address]) 2021   • Malignant neoplasm of prostate ([address]) 2021   • Chronic diastolic congestive heart failure ([address]) 2020   • Chronic venous insufficiency 2019   • Long term (current) use of anticoagulants 2018   • Persistent atrial fibrillation ([address]) 2018   • Dermatofibroma 2018   • Excessive anticoagulation 2018   • Exostosis 2017   • Olecranon bursitis 2017   • FH: colon cancer 2017   • Family history of colonic polyps 2017   • History of colonic polyps 2017   • Abnormality of gait 2016   • Idiopathic peripheral neuropathy 2016   • Paresthesias 2016      LOS (days): 1  Geometric Mean LOS (GMLOS) (days):   Days to GMLOS:     OBJECTIVE:  PATIENT READMITTED TO HOSPITAL  Risk of Unplanned Readmission Score: 24.35         Current admission status: Inpatient       Preferred Pharmacy:   [address] [facility] [place] [address]  [address]  [address] [address]  Phone: [phone] Fax: [phone] [address] [place] [address], [address] [place] [address] Stefan CUELLO [address]  Phone: [phone] Fax: [phone]    Primary Care Provider: Korina Carnes DO    Primary Insurance: [facility]  Secondary Insurance:     ASSESSMENT:  [facility] Representative - Spouse   Primary Phone: [phone] (Mobile)  Home Phone: [phone]               Advance Directives  Does patient have a [address]?: No  Was patient offered paperwork?: Yes (declines)  Does patient currently have a Health Care decision maker?: Yes, please see Health Care Proxy section  Does patient have Advance Directives?: No  Was patient offered paperwork?: Yes (declines)  Primary Contact: Carlos A Rodrigez (Spouse)     [phone] ([phone] ()         Readmission Root Cause  30 Day Readmission: Yes (was here from 8/1-8/3)  Who directed you to return to the hospital?: Self, Family  Did you understand whom to contact if you had questions or problems?: Yes  Did you get your prescriptions before you left the hospital?: No  Reason[de-identified] Preference for own pharmacy  Were you able to get your prescriptions filled when you left the hospital?: Yes  Did you take your medications as prescribed?: Yes  Were you able to get to your follow-up appointments?: No  Reason[de-identified] Readmitted prior to appointment  During previous admission, was a post-acute recommendation made?: Yes  What post-acute resources were offered?: [facility]  Patient was readmitted due to: CHF  Action Plan: home w OP follow up and resumption of [facility]    Patient Information  Admitted from[de-identified] Home  Mental Status: Alert  During Assessment patient was accompanied by: Not accompanied during assessment  Assessment information provided by[de-identified] Patient  Primary Caregiver: Self  Support Systems: Spouse/significant other  [place] of Residence: [address] [place] [address]. do you live in?: [address] [place] [address] entry access options.  Select all that apply.: Ramp  Type of Current Residence: 2 story home  Upon entering residence, is there a bedroom on the main floor (no further steps)?: No  A bedroom is located on the following floor levels of residence (select all that apply):: 2nd Floor  Upon entering residence, is there a bathroom on the main floor (no further steps)?: Yes (1st and 2nd floor)  Number of steps to 2nd floor from main floor: One Flight (stairglide to 2nd floor)  In the last 12 months, was there a time when you were not able to pay the mortgage or rent on time?: No  In the last 12 months, how many places have you lived?: 1  In the last 12 months, was there a time when you did not have a steady place to sleep or slept in a shelter (including now)?: No  Homeless/housing insecurity resource given?: N/A  Living Arrangements: Lives w/ Spouse/significant other  Is patient a ?: Yes    Activities of Daily Living Prior to Admission  Functional Status: Assistance  Completes ADLs independently?: No  Level of ADL dependence: Assistance  Ambulates independently?: Yes  Does patient use assisted devices?: Yes  Assisted Devices (DME) used: Jacquie Leach (Comment), Shower Chair (Leonarda Riedel)  Does patient currently own DME?: Yes  What DME does the patient currently own?: Jacquie Leach (Comment), Shower Chair (Leonarda Riedel)  Does patient have a history of Outpatient Therapy (PT/OT)?: No  Does the patient have a history of Short-Term Rehab?: No  Does patient have a history of HHC?: Yes  Does patient currently have [facility]?: Yes    Current Home Health Care  Type of Current Home Care Services: Home PT, Nurse visit  [address] [place] [address][de-identified] [address] [place] [address] Provider[de-identified] PCP    Patient Information Continued  Income Source: Pension/residential  Does patient have prescription coverage?: Yes  Within the past 12 months, you worried that your food would run out before you got the money to buy more.: Never true  Within the past 12 months, the food you bought just didn't last and you didn't have money to get more.: Never true  Food insecurity resource given?: N/A  Does patient receive dialysis treatments?: No  Does patient have a history of substance abuse?: No  Does patient have a history of Mental Health Diagnosis?: No         Means of Transportation  Means of Transport to Appts[de-identified] Family transport  In the past 12 months, has lack of transportation kept you from medical appointments or from getting medications?: No  In the past 12 months, has lack of transportation kept you from meetings, work, or from getting things needed for daily living?: No  Was application for public transport provided?: N/A        DISCHARGE DETAILS:    Discharge planning discussed with[de-identified] patient  Freedom of Choice: Yes  Comments - Freedom of Choice: resumption [facility] on dc  CM contacted family/caregiver?: No- see comments (declines at this time)  Were Treatment Team discharge recommendations reviewed with patient/caregiver?: Yes  Did patient/caregiver verbalize understanding of patient care needs?: Yes  Were patient/caregiver advised of the risks associated with not following Treatment Team discharge recommendations?: Yes         [address]         Is the patient interested in [facility] at discharge?: Yes  [facility] requested[de-identified] Nursing, Physical Therapy, Occupational [address] [facility] Name[de-identified] [facility] External Referral Reason (only applicable if external A name selected): Patient has established relationship with provider  [facility] Provider[de-identified] PCP  Home Health Services Needed[de-identified] Strengthening/Theraputic Exercises to Improve Function, Gait/ADL Training, Evaluate Functional Status and Safety, Heart Failure Management, Diabetes Management  Homebound Criteria Met[de-identified] Uses an Assist Device (i.e. cane, walker, etc)  Supporting Clincal Findings[de-identified] Limited Endurance              Would you like to participate in our [facility] service program?  : No - Declined       Discharge Destination Plan[de-identified] Home with Home Health Care  Transport at Discharge : Family       Plans at this time are home on dc with OP follow up and resumption of [facility]. CM will follow and assist in dc planning.

## 2024-04-26 NOTE — PROGRESS NOTES
"Assessment/Plan   Diagnoses and all orders for this visit:  Elevated blood pressure affecting pregnancy in third trimester, antepartum ([facility])  Diet controlled gestational diabetes mellitus (GDM) in third trimester ([facility])  Subclinical hypothyroidism  History of hepatitis B virus infection  ASCUS of cervix with negative high risk HPV  Conceived by in vitro fertilization  Female infertility  Encounter for supervision of normal first pregnancy in first trimester ([facility])  Pregnancy complicated by fetal lung lesion, single or unspecified fetus ([facility])  Congenital pulmonary airway malformation (CPAM)  Non-stress test reactive ([facility])      IOL scheduled    Reviewed s/sx of labor, warning signs, fetal movement counts, and when to call provider  Follow up in 1 week for a routine prenatal visit.    DARNELL Govea-ANNITA Whiting     Marisabel Segundo is a 34 y.o.  at 39w1d with a working estimated date of delivery of 2024, by Embryo Transfer who presents for a routine prenatal visit. She denies vaginal bleeding, leakage of fluid, decreased fetal movements, or contractions.    Reviewed blood sugars:  Fastin-76  Breakfast  -   Lunch -   Dinner  - 100-130    Her pregnancy is complicated by:  Problem List Items Addressed This Visit       ASCUS of cervix with negative high risk HPV    Overview     -Last pap ASCUS/HRHPV neg in 2021  -Repeat cotest due in 2024         Conceived by in vitro fertilization    Overview     Weekly NSTs at 36 weeks          Congenital pulmonary airway malformation (CPAM)    Overview     -Last CVR 0.13 (3/7/24)  -s/p fetal echo that re-demonstrated CPAM but was otherwise normal, triage code 1  -s/p MFM consult  -Scheduled for NICU and peds surg consults    Per MFM note @ 36 weeks; \"Possible CPAM: Had had FCC and fetal echo, has had NICU and pediatric surgery consultation. Aware of potential for false positive. Follow up ultrasound in two weeks then plan delivery for " "39+0-6. No needed alteration in labor management other than delivery on main campus.\"    Triage code 1          Diet controlled gestational diabetes mellitus (GDM) in third trimester ([facility])    Overview     4/15/2024 : nutrition           Elevated blood pressure affecting pregnancy in third trimester, antepartum ([facility]) - Primary    Overview     -/90 at 32wga         Encounter for supervision of normal first pregnancy in first trimester ([facility])    Overview       Dating:   [x] Initial BMI: 24  [x] Prenatal Labs:   [x] Genetic Screening:  -s/p risk-reducing cfDNA, normal NT, and normal anatomy  [x] Baby ASA:  [x] Anatomy US:  [x] 1hr GCT at 24-28wks:  [x] Tdap (27-36wks):  [x] Flu Shot: 9/2023  [x] COVID vaccine:   [x] Rhogam (if Rh neg): not indicated   [x] GBS at 36 wks: positive  [] Breastfeeding  [] Postpartum Birth control method:   [x] 39 weeks discussion of IOL vs. Expectant management: [facility] [facility] [place].6   [x] Mode of delivery: vaginal          Female infertility    Overview     IVF pregnancy: plan serial growth, weekly NSTs at 36 wks and delivery 39-39.6         History of hepatitis B virus infection    Overview     -Suspect HBV inactive (prior infection, now cleared) based on positive Ab to core with neg surface Ag and negative viral load  -Follows with Dr. Santiago (), last visit 1/17/24  -For repeat viral load in Feb and March -> if above 10k, consider tenofovir   -Per peds and peds ID, infant does NOT require HBIG postpartum  -No change in delivery plan or labor management         Pregnancy complicated by fetal lung lesion ([facility])    Overview     Growth US at 32 and 36 weeks.   MFM referral placed  Nutrition counseling scheduled    3/14/2024 : nutrition only  3/28/2024 Nutrition plan alone   4/3/2024 : Nutrition plan         Subclinical hypothyroidism    Overview     -TSH 0.3 (10/10) -> synthroid decreased from 75mcg to 50mcg -> TSH 0.61 (11/14)  -Second trimester TSH 1.01   - third " trimester 4/5 THS 1.20, taking syntroid 75mcg as of /10 per 36 week note              Objective   Physical Exam:   Weight: 85.7 kg (189 lb)  TW.8 kg (37 lb)  Expected Total Weight Gain: 11.5 kg (25 lb)-16 kg (35 lb)   Pregravid BMI: 24.55  BP: 120/82  Fetal Heart Rate: NST   Presentation: Vertex  Dilation: 3 Effacement (%): 70 Fetal Station: -3    Postpartum Depression: Not on file      Non-Stress Test   Baseline Fetal Heart Rate for Non-Stress Test: 125 BPM  Variability in Waveform for Non-Stress Test: Moderate  Accelerations in Non-Stress Test: lasting at least 15 seconds, greater than/equal to 15 bpm, Yes  Decelerations in Non-Stress Test: None  Contractions in Non-Stress Test: Irregular  NST Contraction Frequency: 1  Acoustic Stimulator for Non-Stress Test: No  Interpretation of Non-Stress Test   Interpretation of Non-Stress Test: Reactive                              Prenatal Labs  Lab Results   Component Value Date    HGB 13.5 2024    HCT 41.6 2024     2024    ABO AB 2023    LABRH POS 2023    NEISSGONOAMP NEGATIVE 2023    CHLAMTRACAMP NEGATIVE 2023    SYPHT NONREACTIVE 2023    HEPBSAG Nonreactive 2024    HIV1X2 NONREACTIVE 2023    URINECULTURE MIXED URETHRAL NIKKI. 2023     Lab Results   Component Value Date    GLUC1P 179 (H) 2024     Group B Strep Screen   Date Value Ref Range Status   2024 (A)  Corrected    Isolated: Streptococcus agalactiae (Group B Streptococcus)     Comment:     This is an appended report. These results have been appended to a previously final verified report.

## 2024-05-01 ENCOUNTER — ANESTHESIA EVENT (OUTPATIENT)
Dept: OBSTETRICS AND GYNECOLOGY | Facility: HOSPITAL | Age: 34
End: 2024-05-01
Payer: COMMERCIAL

## 2024-05-01 ENCOUNTER — ANESTHESIA (OUTPATIENT)
Dept: OBSTETRICS AND GYNECOLOGY | Facility: HOSPITAL | Age: 34
End: 2024-05-01
Payer: COMMERCIAL

## 2024-05-01 ENCOUNTER — HOSPITAL ENCOUNTER (INPATIENT)
Facility: HOSPITAL | Age: 34
LOS: 3 days | Discharge: HOME | End: 2024-05-04
Attending: OBSTETRICS & GYNECOLOGY
Payer: COMMERCIAL

## 2024-05-01 ENCOUNTER — APPOINTMENT (OUTPATIENT)
Dept: OBSTETRICS AND GYNECOLOGY | Facility: HOSPITAL | Age: 34
End: 2024-05-01
Payer: COMMERCIAL

## 2024-05-01 DIAGNOSIS — Z34.90 ENCOUNTER FOR INDUCTION OF LABOR (HHS-HCC): ICD-10-CM

## 2024-05-01 DIAGNOSIS — Z78.9 CONCEIVED BY IN VITRO FERTILIZATION: ICD-10-CM

## 2024-05-01 DIAGNOSIS — O24.410 DIET CONTROLLED GESTATIONAL DIABETES MELLITUS (GDM) IN THIRD TRIMESTER (HHS-HCC): ICD-10-CM

## 2024-05-01 LAB
ABO GROUP (TYPE) IN BLOOD: NORMAL
ALBUMIN SERPL BCP-MCNC: 3.5 G/DL (ref 3.4–5)
ALP SERPL-CCNC: 170 U/L (ref 33–110)
ALT SERPL W P-5'-P-CCNC: 16 U/L (ref 7–45)
ANION GAP SERPL CALC-SCNC: 15 MMOL/L (ref 10–20)
ANTIBODY SCREEN: NORMAL
AST SERPL W P-5'-P-CCNC: 31 U/L (ref 9–39)
BILIRUB SERPL-MCNC: 0.3 MG/DL (ref 0–1.2)
BUN SERPL-MCNC: 12 MG/DL (ref 6–23)
CALCIUM SERPL-MCNC: 10 MG/DL (ref 8.6–10.6)
CHLORIDE SERPL-SCNC: 105 MMOL/L (ref 98–107)
CO2 SERPL-SCNC: 23 MMOL/L (ref 21–32)
CREAT SERPL-MCNC: 0.65 MG/DL (ref 0.5–1.05)
CREAT UR-MCNC: 53.7 MG/DL (ref 20–320)
EGFRCR SERPLBLD CKD-EPI 2021: >90 ML/MIN/1.73M*2
ERYTHROCYTE [DISTWIDTH] IN BLOOD BY AUTOMATED COUNT: 14 % (ref 11.5–14.5)
GLUCOSE BLD MANUAL STRIP-MCNC: 122 MG/DL (ref 74–99)
GLUCOSE BLD MANUAL STRIP-MCNC: 93 MG/DL (ref 74–99)
GLUCOSE BLD MANUAL STRIP-MCNC: 97 MG/DL (ref 74–99)
GLUCOSE SERPL-MCNC: 111 MG/DL (ref 74–99)
HCT VFR BLD AUTO: 42.2 % (ref 36–46)
HGB BLD-MCNC: 14 G/DL (ref 12–16)
MCH RBC QN AUTO: 31.7 PG (ref 26–34)
MCHC RBC AUTO-ENTMCNC: 33.2 G/DL (ref 32–36)
MCV RBC AUTO: 96 FL (ref 80–100)
NRBC BLD-RTO: 0 /100 WBCS (ref 0–0)
PLATELET # BLD AUTO: 161 X10*3/UL (ref 150–450)
POTASSIUM SERPL-SCNC: 4.4 MMOL/L (ref 3.5–5.3)
PROT SERPL-MCNC: 6.6 G/DL (ref 6.4–8.2)
PROT UR-ACNC: 9 MG/DL (ref 5–24)
PROT/CREAT UR: 0.17 MG/MG CREAT (ref 0–0.17)
RBC # BLD AUTO: 4.41 X10*6/UL (ref 4–5.2)
RH FACTOR (ANTIGEN D): NORMAL
SODIUM SERPL-SCNC: 139 MMOL/L (ref 136–145)
TREPONEMA PALLIDUM IGG+IGM AB [PRESENCE] IN SERUM OR PLASMA BY IMMUNOASSAY: NONREACTIVE
WBC # BLD AUTO: 9.3 X10*3/UL (ref 4.4–11.3)

## 2024-05-01 PROCEDURE — 1120000001 HC OB PRIVATE ROOM DAILY

## 2024-05-01 PROCEDURE — 7210000002 HC LABOR PER HOUR

## 2024-05-01 PROCEDURE — 85027 COMPLETE CBC AUTOMATED: CPT

## 2024-05-01 PROCEDURE — 10907ZC DRAINAGE OF AMNIOTIC FLUID, THERAPEUTIC FROM PRODUCTS OF CONCEPTION, VIA NATURAL OR ARTIFICIAL OPENING: ICD-10-PCS

## 2024-05-01 PROCEDURE — 2500000004 HC RX 250 GENERAL PHARMACY W/ HCPCS (ALT 636 FOR OP/ED)

## 2024-05-01 PROCEDURE — 2500000005 HC RX 250 GENERAL PHARMACY W/O HCPCS

## 2024-05-01 PROCEDURE — 82947 ASSAY GLUCOSE BLOOD QUANT: CPT

## 2024-05-01 PROCEDURE — 51701 INSERT BLADDER CATHETER: CPT

## 2024-05-01 PROCEDURE — 86901 BLOOD TYPING SEROLOGIC RH(D): CPT

## 2024-05-01 PROCEDURE — 84075 ASSAY ALKALINE PHOSPHATASE: CPT

## 2024-05-01 PROCEDURE — 82570 ASSAY OF URINE CREATININE: CPT

## 2024-05-01 PROCEDURE — 3700000014 EPIDURAL BLOCK: Performed by: ANESTHESIOLOGY

## 2024-05-01 PROCEDURE — 86780 TREPONEMA PALLIDUM: CPT

## 2024-05-01 PROCEDURE — 3E033VJ INTRODUCTION OF OTHER HORMONE INTO PERIPHERAL VEIN, PERCUTANEOUS APPROACH: ICD-10-PCS | Performed by: MIDWIFE

## 2024-05-01 PROCEDURE — 86923 COMPATIBILITY TEST ELECTRIC: CPT

## 2024-05-01 PROCEDURE — 36415 COLL VENOUS BLD VENIPUNCTURE: CPT

## 2024-05-01 RX ORDER — LEVOTHYROXINE SODIUM 50 UG/1
50 TABLET ORAL
Status: DISCONTINUED | OUTPATIENT
Start: 2024-05-02 | End: 2024-05-02

## 2024-05-01 RX ORDER — PENICILLIN G 3000000 [IU]/50ML
3 INJECTION, SOLUTION INTRAVENOUS EVERY 4 HOURS
Status: DISCONTINUED | OUTPATIENT
Start: 2024-05-01 | End: 2024-05-02 | Stop reason: HOSPADM

## 2024-05-01 RX ORDER — ONDANSETRON HYDROCHLORIDE 2 MG/ML
4 INJECTION, SOLUTION INTRAVENOUS EVERY 6 HOURS PRN
Status: DISCONTINUED | OUTPATIENT
Start: 2024-05-01 | End: 2024-05-02

## 2024-05-01 RX ORDER — OXYTOCIN/0.9 % SODIUM CHLORIDE 30/500 ML
2-30 PLASTIC BAG, INJECTION (ML) INTRAVENOUS CONTINUOUS
Status: DISCONTINUED | OUTPATIENT
Start: 2024-05-01 | End: 2024-05-02

## 2024-05-01 RX ORDER — LOPERAMIDE HYDROCHLORIDE 2 MG/1
4 CAPSULE ORAL EVERY 2 HOUR PRN
Status: DISCONTINUED | OUTPATIENT
Start: 2024-05-01 | End: 2024-05-02 | Stop reason: HOSPADM

## 2024-05-01 RX ORDER — METHYLERGONOVINE MALEATE 0.2 MG/ML
0.2 INJECTION INTRAVENOUS ONCE AS NEEDED
Status: DISCONTINUED | OUTPATIENT
Start: 2024-05-01 | End: 2024-05-02 | Stop reason: HOSPADM

## 2024-05-01 RX ORDER — MISOPROSTOL 200 UG/1
800 TABLET ORAL ONCE AS NEEDED
Status: DISCONTINUED | OUTPATIENT
Start: 2024-05-01 | End: 2024-05-02 | Stop reason: HOSPADM

## 2024-05-01 RX ORDER — HYDRALAZINE HYDROCHLORIDE 20 MG/ML
5 INJECTION INTRAMUSCULAR; INTRAVENOUS ONCE AS NEEDED
Status: DISCONTINUED | OUTPATIENT
Start: 2024-05-01 | End: 2024-05-02 | Stop reason: HOSPADM

## 2024-05-01 RX ORDER — LEVOTHYROXINE SODIUM 25 UG/1
25 TABLET ORAL
Status: DISCONTINUED | OUTPATIENT
Start: 2024-05-02 | End: 2024-05-01

## 2024-05-01 RX ORDER — SODIUM CHLORIDE, SODIUM LACTATE, POTASSIUM CHLORIDE, CALCIUM CHLORIDE 600; 310; 30; 20 MG/100ML; MG/100ML; MG/100ML; MG/100ML
125 INJECTION, SOLUTION INTRAVENOUS CONTINUOUS
Status: DISCONTINUED | OUTPATIENT
Start: 2024-05-01 | End: 2024-05-02

## 2024-05-01 RX ORDER — LIDOCAINE HYDROCHLORIDE 10 MG/ML
30 INJECTION INFILTRATION; PERINEURAL ONCE AS NEEDED
Status: DISCONTINUED | OUTPATIENT
Start: 2024-05-01 | End: 2024-05-02 | Stop reason: HOSPADM

## 2024-05-01 RX ORDER — CARBOPROST TROMETHAMINE 250 UG/ML
250 INJECTION, SOLUTION INTRAMUSCULAR ONCE AS NEEDED
Status: COMPLETED | OUTPATIENT
Start: 2024-05-01 | End: 2024-05-02

## 2024-05-01 RX ORDER — LABETALOL HYDROCHLORIDE 5 MG/ML
20 INJECTION, SOLUTION INTRAVENOUS ONCE AS NEEDED
Status: DISCONTINUED | OUTPATIENT
Start: 2024-05-01 | End: 2024-05-02 | Stop reason: HOSPADM

## 2024-05-01 RX ORDER — NIFEDIPINE 10 MG/1
10 CAPSULE ORAL ONCE AS NEEDED
Status: DISCONTINUED | OUTPATIENT
Start: 2024-05-01 | End: 2024-05-02 | Stop reason: HOSPADM

## 2024-05-01 RX ORDER — OXYTOCIN/0.9 % SODIUM CHLORIDE 30/500 ML
60 PLASTIC BAG, INJECTION (ML) INTRAVENOUS ONCE AS NEEDED
Status: DISCONTINUED | OUTPATIENT
Start: 2024-05-01 | End: 2024-05-02 | Stop reason: HOSPADM

## 2024-05-01 RX ORDER — TRANEXAMIC ACID 100 MG/ML
1000 INJECTION, SOLUTION INTRAVENOUS ONCE AS NEEDED
Status: COMPLETED | OUTPATIENT
Start: 2024-05-01 | End: 2024-05-02

## 2024-05-01 RX ORDER — ONDANSETRON 4 MG/1
4 TABLET, FILM COATED ORAL EVERY 6 HOURS PRN
Status: DISCONTINUED | OUTPATIENT
Start: 2024-05-01 | End: 2024-05-02

## 2024-05-01 RX ORDER — FENTANYL/BUPIVACAINE/NS/PF 2MCG/ML-.1
PLASTIC BAG, INJECTION (ML) INJECTION AS NEEDED
Status: DISCONTINUED | OUTPATIENT
Start: 2024-05-01 | End: 2024-05-02

## 2024-05-01 RX ORDER — OXYTOCIN 10 [USP'U]/ML
10 INJECTION, SOLUTION INTRAMUSCULAR; INTRAVENOUS ONCE AS NEEDED
Status: DISCONTINUED | OUTPATIENT
Start: 2024-05-01 | End: 2024-05-02 | Stop reason: HOSPADM

## 2024-05-01 RX ORDER — FENTANYL/BUPIVACAINE/NS/PF 2MCG/ML-.1
PLASTIC BAG, INJECTION (ML) INJECTION CONTINUOUS PRN
Status: DISCONTINUED | OUTPATIENT
Start: 2024-05-01 | End: 2024-05-02

## 2024-05-01 RX ORDER — METOCLOPRAMIDE HYDROCHLORIDE 5 MG/ML
10 INJECTION INTRAMUSCULAR; INTRAVENOUS EVERY 6 HOURS PRN
Status: DISCONTINUED | OUTPATIENT
Start: 2024-05-01 | End: 2024-05-02

## 2024-05-01 RX ORDER — METOCLOPRAMIDE 10 MG/1
10 TABLET ORAL EVERY 6 HOURS PRN
Status: DISCONTINUED | OUTPATIENT
Start: 2024-05-01 | End: 2024-05-02

## 2024-05-01 RX ORDER — TERBUTALINE SULFATE 1 MG/ML
0.25 INJECTION SUBCUTANEOUS ONCE AS NEEDED
Status: COMPLETED | OUTPATIENT
Start: 2024-05-01 | End: 2024-05-02

## 2024-05-01 RX ADMIN — PENICILLIN G 3 MILLION UNITS: 3000000 INJECTION, SOLUTION INTRAVENOUS at 15:47

## 2024-05-01 RX ADMIN — PENICILLIN G 3 MILLION UNITS: 3000000 INJECTION, SOLUTION INTRAVENOUS at 19:57

## 2024-05-01 RX ADMIN — Medication 2 MILLI-UNITS/MIN: at 12:32

## 2024-05-01 RX ADMIN — PENICILLIN G POTASSIUM 5 MILLION UNITS: 5000000 INJECTION, POWDER, FOR SOLUTION INTRAMUSCULAR; INTRAVENOUS at 12:07

## 2024-05-01 RX ADMIN — PENICILLIN G 3 MILLION UNITS: 3000000 INJECTION, SOLUTION INTRAVENOUS at 23:47

## 2024-05-01 RX ADMIN — FENTANYL CITRATE 5 ML: 50 INJECTION INTRAMUSCULAR; INTRAVENOUS at 19:28

## 2024-05-01 RX ADMIN — FENTANYL CITRATE 14 ML/HR: 50 INJECTION INTRAMUSCULAR; INTRAVENOUS at 19:32

## 2024-05-01 RX ADMIN — SODIUM CHLORIDE, POTASSIUM CHLORIDE, SODIUM LACTATE AND CALCIUM CHLORIDE 125 ML/HR: 600; 310; 30; 20 INJECTION, SOLUTION INTRAVENOUS at 11:57

## 2024-05-01 RX ADMIN — SODIUM CHLORIDE, POTASSIUM CHLORIDE, SODIUM LACTATE AND CALCIUM CHLORIDE 500 ML: 600; 310; 30; 20 INJECTION, SOLUTION INTRAVENOUS at 18:57

## 2024-05-01 RX ADMIN — FENTANYL CITRATE 5 ML: 50 INJECTION INTRAMUSCULAR; INTRAVENOUS at 19:31

## 2024-05-01 RX ADMIN — Medication: at 17:55

## 2024-05-01 RX ADMIN — SODIUM CHLORIDE, POTASSIUM CHLORIDE, SODIUM LACTATE AND CALCIUM CHLORIDE 125 ML/HR: 600; 310; 30; 20 INJECTION, SOLUTION INTRAVENOUS at 23:46

## 2024-05-01 SDOH — ECONOMIC STABILITY: FOOD INSECURITY: WITHIN THE PAST 12 MONTHS, YOU WORRIED THAT YOUR FOOD WOULD RUN OUT BEFORE YOU GOT MONEY TO BUY MORE.: NEVER TRUE

## 2024-05-01 SDOH — HEALTH STABILITY: MENTAL HEALTH: NON-SPECIFIC ACTIVE SUICIDAL THOUGHTS (PAST 1 MONTH): NO

## 2024-05-01 SDOH — ECONOMIC STABILITY: FOOD INSECURITY

## 2024-05-01 SDOH — ECONOMIC STABILITY: FOOD INSECURITY: WITHIN THE PAST 12 MONTHS, THE FOOD YOU BOUGHT JUST DIDN'T LAST AND YOU DIDN'T HAVE MONEY TO GET MORE.: NEVER TRUE

## 2024-05-01 SDOH — ECONOMIC STABILITY: FOOD INSECURITY: WITHIN THE PAST 12 MONTHS, YOU WORRIED THAT YOUR FOOD WOULD RUN OUT BEFORE YOU GOT THE MONEY TO BUY MORE.: NEVER TRUE

## 2024-05-01 SDOH — HEALTH STABILITY: MENTAL HEALTH: HAVE YOU USED ANY PRESCRIPTION DRUGS OTHER THAN PRESCRIBED IN THE PAST 12 MONTHS?: NO

## 2024-05-01 SDOH — ECONOMIC STABILITY: HOUSING INSECURITY: DO YOU FEEL UNSAFE GOING BACK TO THE PLACE WHERE YOU ARE LIVING?: NO

## 2024-05-01 SDOH — SOCIAL STABILITY: SOCIAL INSECURITY: VERBAL ABUSE: DENIES

## 2024-05-01 SDOH — HEALTH STABILITY: MENTAL HEALTH: WISH TO BE DEAD (PAST 1 MONTH): NO

## 2024-05-01 SDOH — SOCIAL STABILITY: SOCIAL INSECURITY: DOES ANYONE TRY TO KEEP YOU FROM HAVING/CONTACTING OTHER FRIENDS OR DOING THINGS OUTSIDE YOUR HOME?: NO

## 2024-05-01 SDOH — ECONOMIC STABILITY: TRANSPORTATION INSECURITY

## 2024-05-01 SDOH — SOCIAL STABILITY: SOCIAL INSECURITY: HAS ANYONE EVER THREATENED TO HURT YOUR FAMILY OR YOUR PETS?: NO

## 2024-05-01 SDOH — HEALTH STABILITY: MENTAL HEALTH: SUICIDAL BEHAVIOR (LIFETIME): NO

## 2024-05-01 SDOH — SOCIAL STABILITY: SOCIAL INSECURITY: DO YOU FEEL ANYONE HAS EXPLOITED OR TAKEN ADVANTAGE OF YOU FINANCIALLY OR OF YOUR PERSONAL PROPERTY?: NO

## 2024-05-01 SDOH — HEALTH STABILITY: MENTAL HEALTH: WERE YOU ABLE TO COMPLETE ALL THE BEHAVIORAL HEALTH SCREENINGS?: YES

## 2024-05-01 SDOH — ECONOMIC STABILITY: FOOD INSECURITY: WITHIN THE PAST 12 MONTHS, THE FOOD YOU BOUGHT JUST DIDN’T LAST AND YOU DIDN’T HAVE MONEY TO GET MORE.: NEVER TRUE

## 2024-05-01 SDOH — SOCIAL STABILITY: SOCIAL INSECURITY: ABUSE SCREEN: ADULT

## 2024-05-01 SDOH — SOCIAL STABILITY: SOCIAL INSECURITY: PHYSICAL ABUSE: DENIES

## 2024-05-01 SDOH — SOCIAL STABILITY: SOCIAL INSECURITY: HAVE YOU HAD ANY THOUGHTS OF HARMING ANYONE ELSE?: NO

## 2024-05-01 SDOH — ECONOMIC STABILITY: TRANSPORTATION INSECURITY: IN THE PAST 12 MONTHS, HAS LACK OF TRANSPORTATION KEPT YOU FROM MEDICAL APPOINTMENTS OR FROM GETTING MEDICATIONS?: NO

## 2024-05-01 SDOH — ECONOMIC STABILITY: TRANSPORTATION INSECURITY
IN THE PAST 12 MONTHS, HAS THE LACK OF TRANSPORTATION KEPT YOU FROM MEDICAL APPOINTMENTS OR FROM GETTING MEDICATIONS?: NO

## 2024-05-01 SDOH — SOCIAL STABILITY: SOCIAL INSECURITY: HAVE YOU HAD THOUGHTS OF HARMING ANYONE ELSE?: NO

## 2024-05-01 SDOH — ECONOMIC STABILITY: TRANSPORTATION INSECURITY
IN THE PAST 12 MONTHS, HAS LACK OF TRANSPORTATION KEPT YOU FROM MEETINGS, WORK, OR FROM GETTING THINGS NEEDED FOR DAILY LIVING?: NO

## 2024-05-01 SDOH — SOCIAL STABILITY: SOCIAL INSECURITY: ARE THERE ANY APPARENT SIGNS OF INJURIES/BEHAVIORS THAT COULD BE RELATED TO ABUSE/NEGLECT?: NO

## 2024-05-01 SDOH — SOCIAL STABILITY: SOCIAL INSECURITY: ARE YOU OR HAVE YOU BEEN THREATENED OR ABUSED PHYSICALLY, EMOTIONALLY, OR SEXUALLY BY ANYONE?: NO

## 2024-05-01 SDOH — HEALTH STABILITY: MENTAL HEALTH: HAVE YOU USED ANY SUBSTANCES (CANABIS, COCAINE, HEROIN, HALLUCINOGENS, INHALANTS, ETC.) IN THE PAST 12 MONTHS?: NO

## 2024-05-01 ASSESSMENT — PAIN SCALES - GENERAL
PAINLEVEL_OUTOF10: 0 - NO PAIN
PAINLEVEL_OUTOF10: 5 - MODERATE PAIN
PAINLEVEL_OUTOF10: 5 - MODERATE PAIN
PAINLEVEL_OUTOF10: 0 - NO PAIN
PAINLEVEL_OUTOF10: 4
PAINLEVEL_OUTOF10: 4
PAINLEVEL_OUTOF10: 0 - NO PAIN
PAINLEVEL_OUTOF10: 5 - MODERATE PAIN

## 2024-05-01 ASSESSMENT — ACTIVITIES OF DAILY LIVING (ADL)
HOW_WELL_CAN_YOU_FEED_YOURSELF: INDEPENDENTLY
HOW_WELL_CAN_YOU_COMPLETE_GROOMING_TASKS: INDEPENDENTLY
ADL_BEFORE_ADMISSION: BOTH
ADL_BEFORE_ADMISSION: RIGHT
HOW_WELL_CAN_YOU_USE_BATHROOM_BY_YOURSELF: INDEPENDENTLY
DRESSING: INDEPENDENT
HOW_WELL_CAN_YOU_DRESS_YOURSELF: INDEPENDENTLY
TOILETING: INDEPENDENT
FEEDING: INDEPENDENT
BATHING: INDEPENDENT
HEARING_LEFT_EAR: NO PROBLEMS
ADEQUATE_TO_COMPLETE_ADL: YES
ADL_BEFORE_ADMISSION: INDEPENDENTLY
HEARING_RIGHT_EAR: NO PROBLEMS
LACK_OF_TRANSPORTATION: NO
HOW_WELL_CAN_YOU_BATHE_YOURSELF: INDEPENDENTLY
LACK_OF_TRANSPORTATION: NO
WALKS_IN_HOME: INDEPENDENTLY
ADEQUATE_TO_COMPLETE_ADL: YES

## 2024-05-01 ASSESSMENT — LIFESTYLE VARIABLES
AUDIT-C TOTAL SCORE: 0
HOW OFTEN DO YOU HAVE A DRINK CONTAINING ALCOHOL: NEVER
SKIP TO QUESTIONS 9-10: 1
AUDIT-C TOTAL SCORE: 0
HOW OFTEN DO YOU HAVE 6 OR MORE DRINKS ON ONE OCCASION: NEVER
HOW MANY STANDARD DRINKS CONTAINING ALCOHOL DO YOU HAVE ON A TYPICAL DAY: PATIENT DOES NOT DRINK

## 2024-05-01 ASSESSMENT — PATIENT HEALTH QUESTIONNAIRE - PHQ9
2. FEELING DOWN, DEPRESSED OR HOPELESS: NOT AT ALL
1. LITTLE INTEREST OR PLEASURE IN DOING THINGS: NOT AT ALL
SUM OF ALL RESPONSES TO PHQ9 QUESTIONS 1 & 2: 0

## 2024-05-01 NOTE — H&P
Delayed entry from 1215 due to patient care    Obstetrical Admission History and Physical     Marisabel Segundo is a 34 y.o.  at 39w6d. JOSIAH: 2024, by Embryo Transfer. Estimated fetal weight: 8.1# extrapolated from 38w growth scan on 24 with an EFW of 3680g at that time. She has had prenatal care with Ashley Lang .    Chief Complaint: No chief complaint on file.    Assessment/Plan    IOL, Cervix favorable  - Admit to L&D with routine lab orders  - Induce with Oxytocin   - Clear liquid diet  - Labor analgesia PRN, pt desires epidural eventually  - encourage position changes  - Recheck cervix in 4 hours; will AROM with next exam if appropriate  - Anticipate SVB    Cat 1 FHR tracing  - continue cEFM    GBS Positive  - prophylaxis with PCN q4hr    Fetal CPAM  - MAC NICU attending notified of patient admission  - Plan for NICU team to be present at delivery  - Code 1 cardiology consult    Subclinical hypothyroidism  - pt takes 75mcg synthroid daily; ordered to continue during inpatient stay    GDMA1  - POCT BG q4 hour during latent labor, q2hr during active labor  - 122 mg/dL on arrival  - will consult MD team if insulin coverage is needed    History of chronic Hep B infection  - viral load undetectable third trimester    Anxiety  - no meds    Gestational Hypertension  - One elevated BP outpatient on 3/7/24 of 152/90  - Elevated BP on admission of 159/92; criteria met for GHTN  - PCR, CMP and CBC all collected and WNL  - continue monitoring maternal VS per unit routine    MD team aware of admission and plan of care    SOLE Mendenhall      Principal Problem:    Encounter for induction of labor ([facility])      Pregnancy Problems (from 23 to present)       Problem Noted Resolved    Encounter for induction of labor ([facility]) 2024 by SOLE Mendenhall No    Priority:  Medium      Diet controlled gestational diabetes mellitus (GDM) in third trimester ([facility]) 4/15/2024 by Lyla Soto RN No     Priority:  Medium      Overview Signed 4/15/2024 11:24 AM by Lyla Soto RN     4/15/2024 : nutrition           Elevated blood pressure affecting pregnancy in third trimester, antepartum ([facility]) 3/7/2024 by Vanesa Sierra MD No    Priority:  Medium      Overview Signed 3/22/2024  6:42 AM by Lottie Mcgrath MD     -/90 at 32wga         Pregnancy complicated by fetal lung lesion ([facility]) 2/23/2024 by SOLE Ott No    Priority:  Medium      Overview Addendum 4/3/2024  3:28 PM by Lyla Soto RN     Growth US at 32 and 36 weeks.   MFM referral placed  Nutrition counseling scheduled    3/14/2024 : nutrition only  3/28/2024 Nutrition plan alone   4/3/2024 : Nutrition plan         Encounter for supervision of normal first pregnancy in first trimester ([facility]) 10/10/2023 by Suzi Hart MD No    Priority:  Medium      Overview Addendum 4/21/2024 11:43 PM by MANDA Govea       Dating:   [x] Initial BMI: 24  [x] Prenatal Labs:   [x] Genetic Screening:  -s/p risk-reducing cfDNA, normal NT, and normal anatomy  [x] Baby ASA:  [x] Anatomy US:  [x] 1hr GCT at 24-28wks:  [x] Tdap (27-36wks):  [x] Flu Shot: 9/2023  [x] COVID vaccine:   [x] Rhogam (if Rh neg): not indicated   [x] GBS at 36 wks: positive  [] Breastfeeding  [] Postpartum Birth control method:   [x] 39 weeks discussion of IOL vs. Expectant management: IOL scheduled at [facility] at 39.6   [x] Mode of delivery: vaginal          11 weeks gestation of pregnancy ([facility]) 10/10/2023 by Suzi Hart MD 12/5/2023 by Lottie Mcgrath MD          Options for delivery have been discussed with the patient and she elects for an induction of labor.  Cervical ripening with cytotec, cervidil, other prostaglandin agents has been discussed.  Induction of labor with pitocin, amniotomy, cytotec, and cervical balloon have been discussed in detail. The risks, benefits, complications, alternatives, expected outcomes, potential problems  during recuperation and recovery, and the risks of not performing the procedure were discussed with the patient. The patient stated understanding that the risks of delivery include, but are not limited to: death; reaction to medications; injury to bowel, bladder, ureters, uterus, cervix, vagina, and other pelvic and abdominal structures, infection; blood loss and possible need for transfusion; and potential need for surgery, including hysterectomy. The risks of injury to the infant during delivery were also discussed. All questions were answered. There was concurrence with the planned procedure, and the patient wanted to proceed.    Admit to inpatient status. I anticipate that this patient will require a stay exceeding at least 2 midnights for delivery and postpartum.  Induction of labor.  Management of pregnancy complications, as indicated.    Subjective   Good fetal movement. Denies vaginal bleeding., Denies contractions., Denies leaking of fluid.       Reason for Induction of Labor:  Pregnancy at 39 weeks or greater for induction  IVF Pregnancy  GDMA1       Obstetrical History   OB History    Para Term  AB Living   1             SAB IAB Ectopic Multiple Live Births                  # Outcome Date GA Lbr Babak/2nd Weight Sex Delivery Anes PTL Lv   1 Current                Past Medical History  Past Medical History:   Diagnosis Date    Chronic viral hepatitis B without delta-agent ([facility]) 2022    Chronic hepatitis B    Polycystic ovarian syndrome     PCOS (polycystic ovarian syndrome)        Past Surgical History   Past Surgical History:   Procedure Laterality Date    OTHER SURGICAL HISTORY  2021    Shoulder surgery       Social History  Social History     Tobacco Use    Smoking status: Never    Smokeless tobacco: Never   Substance Use Topics    Alcohol use: Not Currently     Substance and Sexual Activity   Drug Use Never       Allergies  Patient has no known allergies.      Medications  Medications Prior to Admission   Medication Sig Dispense Refill Last Dose    aspirin 81 mg EC tablet Take 2 tablets (162 mg) by mouth once daily.   4/30/2024    BD Alcohol Swabs pads, medicated Apply 1 Pad topically 4 times a day. 90 each 3 5/1/2024    Blood glucose monitoring meter kit kit 1 each if needed (4 times daily). 1 each 0 5/1/2024    blood sugar diagnostic strip 1 strip 4 times a day. 120 strip 4 5/1/2024    cholecalciferol (Vitamin D-3) 5,000 Units tablet Take 1 tablet (5,000 Units) by mouth once daily.   4/30/2024    lancets misc 1 strip 4 times a day. 120 each 4 5/1/2024    levothyroxine (Synthroid) 50 mcg tablet Take 1 tablet (50 mcg) by mouth once daily in the morning. Take before meals. 30 tablet 11 5/1/2024    prenatal no115/iron/folic acid (PRENATAL 19 ORAL) Take 1 tablet by mouth once daily.   4/30/2024       Objective    Last Vitals  Temp Pulse Resp BP MAP O2 Sat   36.5 °C (97.7 °F) 65 16 (!) 155/95   99 %     Physical Examination  GENERAL: Examination reveals a well developed, well nourished, gravid female in no acute distress. She is alert and cooperative.  HEENT:  normocephalic, atraumatic  LUNGS:  clear, unlabored respirations  ABDOMEN: soft, gravid, nontender, nondistended, no abnormal masses, no epigastric pain  FHR is 135bpm, mod variability, + accels, - decels; Cat 1 fht   [place] reading: one ctx noted x5min in duration   The fetus is in a vertex presentation, determined by ultrasound  Current Estimated Fetal Weight 8.1# established by extrapolation from 38 week ultrasound, consistent with Leopold's maneuver  CERVIX: exam deferred; 3/70/-3 in office on 4/26/24  SKIN: normal coloration and turgor, no rashes  NEUROLOGICAL: alert, oriented, normal speech, no focal findings or movement disorder noted  PSYCHOLOGICAL: awake and alert; oriented to person, place, and time    Lab Review  Lab Results   Component Value Date    ABO AB 05/01/2024    LABRH POS 05/01/2024    ABSCRN NEG  05/01/2024     Lab Results   Component Value Date    WBC 9.3 05/01/2024    HGB 14.0 05/01/2024    HCT 42.2 05/01/2024     05/01/2024     Lab Results   Component Value Date    GLUCOSE 111 (H) 05/01/2024     05/01/2024    K 4.4 05/01/2024     05/01/2024    CO2 23 05/01/2024    ANIONGAP 15 05/01/2024    BUN 12 05/01/2024    CREATININE 0.65 05/01/2024    EGFR >90 05/01/2024    CALCIUM 10.0 05/01/2024    ALBUMIN 3.5 05/01/2024    PROT 6.6 05/01/2024    ALKPHOS 170 (H) 05/01/2024    ALT 16 05/01/2024    AST 31 05/01/2024    BILITOT 0.3 05/01/2024     Lab Results   Component Value Date    UTPCR 0.17 05/01/2024

## 2024-05-01 NOTE — ANESTHESIA PROCEDURE NOTES
Epidural Block    Patient location during procedure: OB  Start time: 5/1/2024 7:15 PM  End time: 5/1/2024 7:26 PM  Reason for block: labor analgesia  Staffing  Performed: RALF   Authorized by: RALF Erickson    Performed by: RALF Erickson    Preanesthetic Checklist  Completed: patient identified, IV checked, risks and benefits discussed, surgical consent, pre-op evaluation, timeout performed and sterile techniques followed  Block Timeout  RN/Licensed healthcare professional reads aloud to the Anesthesia provider and entire team: Patient identity, procedure with side and site, patient position, and as applicable the availability of implants/special equipment/special requirements.  Patient on coagulant treatment: no  Timeout performed at: 5/1/2024 7:15 PM  Block Placement  Patient position: sitting  Prep: ChloraPrep  Sterility prep: mask, gloves, drape and cap  Sedation level: no sedation  Patient monitoring: heart rate, continuous pulse oximetry and blood pressure  Approach: midline  Local numbing: lidocaine 1% to skin and subcutaneous tissues  Vertebral space: lumbar  Lumbar location: L3-L4  Epidural  Loss of resistance technique: saline  Guidance: landmark technique        Needle  Needle type: Tuohy   Needle gauge: 17  Needle length: 8.9cm  Needle insertion depth: 4.5 cm  Catheter type: multi-orifice  Catheter size: 19 G  Catheter at skin depth: 9.5 cm  Catheter securement method: clear occlusive dressing and liquid medical adhesive    Test dose: lidocaine 1.5% with epinephrine 1-to-200,000  Test dose: lidocaine 1.5% with epinephrine 1-to-200,000  Test dose result: no positive test dose    PCEA  Medication concentration used: 0.044% Bupivacaine with 1.25 mcg/mL Fentanyl and 1:850602 Epinephrine  Dose (mL): 10  Lockout (minutes): 15  1-Hour Limit (boluses/hr): 4  Basal Rate: 14        Assessment  Sensory level: T10 bilateral  Block outcome: patient comfortable  Number of attempts: 1  Events: no  positive test dose  Procedure assessment: patient tolerated procedure well with no immediate complications

## 2024-05-01 NOTE — ANESTHESIA PREPROCEDURE EVALUATION
Patient: Marisabel Segundo    Evaluation Method: In-person visit    Procedure Information    Date: 05/01/24  Procedure: Labor Consult         Relevant Problems   Cardiac (within normal limits)      Pulmonary (within normal limits)      Neuro (within normal limits)      GI (within normal limits)      /Renal (within normal limits)      Liver  Hx chronic hepatitis B, undetectable viral load and nml function      Endocrine   (+) Diet controlled gestational diabetes mellitus (GDM) in third trimester ([facility])   (+) Subclinical hypothyroidism      Hematology (within normal limits)      GYN   (+) Encounter for supervision of normal first pregnancy in first trimester ([facility])       Clinical information reviewed:   Tobacco  Allergies  Meds   Med Hx  Surg Hx   Fam Hx          NPO Detail:  No data recorded     OB/Gyn Evaluation    Present Pregnancy    (+) , gestational diabetes   Obstetric History                Physical Exam    Airway  Mallampati: I  TM distance: >3 FB  Neck ROM: full     Cardiovascular    Dental    Pulmonary    Abdominal          Anesthesia Plan    History of general anesthesia?: yes  History of complications of general anesthesia?: no    ASA 2     epidural     Anesthetic plan and risks discussed with patient.  Use of blood products discussed with patient who consented to blood products.

## 2024-05-01 NOTE — PROGRESS NOTES
S: Marisabel in resting in bed upon entering the room. She feels tightness with contractions, however no pain. She consents to a cervical exam and AROM.     O: FHR 130bpm, moderate variability, accels present, no decels  [place] 1.5-2.5min  SVE 4/80/-2  Oxytocin infusing at 12mu  PEC labs WNL    A: IUP @39.6  IOL, latent phase  Cat 1 fht  GBS Positive  GHTN    P: AROM with this exam for large amount of clear fluid  Encourage position changes  Labor analgesia PRN  Continue titration of oxytocin per protocol  Next SVE as needed for maternal or fetal indications  Anticipate SVB    Armida May, DARNELL-YURIDIAM

## 2024-05-01 NOTE — ANESTHESIA PREPROCEDURE EVALUATION
Patient: Marisabel Segundo    Evaluation Method: In-person visit    Procedure Information    Date: 05/01/24  Procedure: Labor Consult         Relevant Problems   Cardiac (within normal limits)      Pulmonary (within normal limits)      Neuro (within normal limits)      GI (within normal limits)      /Renal (within normal limits)      Liver  Hx chronic hepatitis B, undetectable viral load and nml function      Endocrine   (+) Diet controlled gestational diabetes mellitus (GDM) in third trimester ([facility])   (+) Subclinical hypothyroidism      Hematology (within normal limits)      GYN   (+) Encounter for supervision of normal first pregnancy in first trimester ([facility])       Clinical information reviewed:   Tobacco  Allergies  Meds   Med Hx  Surg Hx   Fam Hx          NPO Detail:  No data recorded     OB/Gyn Evaluation    Present Pregnancy    (+) , gestational diabetes   Obstetric History            Physical Exam    Airway  Mallampati: I  TM distance: >3 FB  Neck ROM: full     Cardiovascular    Dental    Pulmonary    Abdominal        Anesthesia Plan    History of general anesthesia?: yes  History of complications of general anesthesia?: no    ASA 2     epidural     Anesthetic plan and risks discussed with patient.  Use of blood products discussed with patient who consented to blood products.

## 2024-05-01 NOTE — PROGRESS NOTES
"S: Marisabel is dangling on the edge of her bed, utilizing nitrous oxide for labor analgesia. She states it is \"taking the edge off\" and she is coping well with contractions. She denies HA, vision changes, or RUQ pain.    O: FHR 125bpm, mod variability, + accels, - decels  [place] q1.5-3.5min  SVE deferred  Oxytocin infusing at 16mu  Severe range BP of 179/89 noted at 1728, repeat 147/93    A: IUP @ 39.6  IOL, latent phase  ROM x2.5hrs  Cat 1 fht  GBS Pos  GHTN  Fetal CPAM    P: Continue oxytocin per protocol  Pt may proceed with epidural when ready  Encourage position changes  Discussed with patient that in the event of sustained, severe-range BP's, she would require antihypertensive treatment and transfer to physician care  Anticipate SVB    DARNELL Mendenhall-CNM    "

## 2024-05-02 LAB
GLUCOSE BLD MANUAL STRIP-MCNC: 108 MG/DL (ref 74–99)
GLUCOSE BLD MANUAL STRIP-MCNC: 128 MG/DL (ref 74–99)
GLUCOSE BLD MANUAL STRIP-MCNC: 82 MG/DL (ref 74–99)

## 2024-05-02 PROCEDURE — 2500000004 HC RX 250 GENERAL PHARMACY W/ HCPCS (ALT 636 FOR OP/ED)

## 2024-05-02 PROCEDURE — 88307 TISSUE EXAM BY PATHOLOGIST: CPT | Performed by: PATHOLOGY

## 2024-05-02 PROCEDURE — 2500000005 HC RX 250 GENERAL PHARMACY W/O HCPCS

## 2024-05-02 PROCEDURE — 2500000001 HC RX 250 WO HCPCS SELF ADMINISTERED DRUGS (ALT 637 FOR MEDICARE OP)

## 2024-05-02 PROCEDURE — 2720000007 HC OR 272 NO HCPCS: Performed by: OBSTETRICS & GYNECOLOGY

## 2024-05-02 PROCEDURE — 51701 INSERT BLADDER CATHETER: CPT

## 2024-05-02 PROCEDURE — 59510 CESAREAN DELIVERY: CPT

## 2024-05-02 PROCEDURE — 4A1H7CZ MONITORING OF PRODUCTS OF CONCEPTION, CARDIAC RATE, VIA NATURAL OR ARTIFICIAL OPENING: ICD-10-PCS | Performed by: OBSTETRICS & GYNECOLOGY

## 2024-05-02 PROCEDURE — 7100000016 HC LABOR RECOVERY PER HOUR

## 2024-05-02 PROCEDURE — 1100000001 HC PRIVATE ROOM DAILY

## 2024-05-02 PROCEDURE — 3700000014 HC AN EPIDURAL BLOCK CHARGE: Performed by: OBSTETRICS & GYNECOLOGY

## 2024-05-02 PROCEDURE — 82947 ASSAY GLUCOSE BLOOD QUANT: CPT

## 2024-05-02 PROCEDURE — 7210000002 HC LABOR PER HOUR

## 2024-05-02 PROCEDURE — 88307 TISSUE EXAM BY PATHOLOGIST: CPT | Mod: TC,SUR

## 2024-05-02 PROCEDURE — 7100000016 HC LABOR RECOVERY PER HOUR: Performed by: OBSTETRICS & GYNECOLOGY

## 2024-05-02 PROCEDURE — 59514 CESAREAN DELIVERY ONLY: CPT | Performed by: OBSTETRICS & GYNECOLOGY

## 2024-05-02 RX ORDER — CARBOPROST TROMETHAMINE 250 UG/ML
250 INJECTION, SOLUTION INTRAMUSCULAR ONCE AS NEEDED
Status: DISCONTINUED | OUTPATIENT
Start: 2024-05-02 | End: 2024-05-04 | Stop reason: HOSPADM

## 2024-05-02 RX ORDER — CEFEPIME HYDROCHLORIDE 2 G/1
INJECTION, POWDER, FOR SOLUTION INTRAVENOUS AS NEEDED
Status: DISCONTINUED | OUTPATIENT
Start: 2024-05-02 | End: 2024-05-02

## 2024-05-02 RX ORDER — HYDROMORPHONE HYDROCHLORIDE 1 MG/ML
0.2 INJECTION, SOLUTION INTRAMUSCULAR; INTRAVENOUS; SUBCUTANEOUS EVERY 5 MIN PRN
Status: DISCONTINUED | OUTPATIENT
Start: 2024-05-02 | End: 2024-05-04 | Stop reason: HOSPADM

## 2024-05-02 RX ORDER — BUTORPHANOL TARTRATE 2 MG/ML
1 INJECTION INTRAMUSCULAR; INTRAVENOUS
Status: DISCONTINUED | OUTPATIENT
Start: 2024-05-02 | End: 2024-05-04 | Stop reason: HOSPADM

## 2024-05-02 RX ORDER — FAMOTIDINE 10 MG/ML
INJECTION INTRAVENOUS AS NEEDED
Status: DISCONTINUED | OUTPATIENT
Start: 2024-05-02 | End: 2024-05-02

## 2024-05-02 RX ORDER — ENOXAPARIN SODIUM 100 MG/ML
40 INJECTION SUBCUTANEOUS EVERY 24 HOURS
Status: DISCONTINUED | OUTPATIENT
Start: 2024-05-03 | End: 2024-05-04 | Stop reason: HOSPADM

## 2024-05-02 RX ORDER — OXYTOCIN 10 [USP'U]/ML
10 INJECTION, SOLUTION INTRAMUSCULAR; INTRAVENOUS ONCE AS NEEDED
Status: DISCONTINUED | OUTPATIENT
Start: 2024-05-02 | End: 2024-05-04 | Stop reason: HOSPADM

## 2024-05-02 RX ORDER — ADHESIVE BANDAGE
10 BANDAGE TOPICAL
Status: DISCONTINUED | OUTPATIENT
Start: 2024-05-02 | End: 2024-05-04 | Stop reason: HOSPADM

## 2024-05-02 RX ORDER — OXYCODONE HYDROCHLORIDE 5 MG/1
10 TABLET ORAL EVERY 4 HOURS PRN
Status: DISCONTINUED | OUTPATIENT
Start: 2024-05-03 | End: 2024-05-04 | Stop reason: HOSPADM

## 2024-05-02 RX ORDER — OXYCODONE HYDROCHLORIDE 5 MG/1
5 TABLET ORAL EVERY 4 HOURS PRN
Status: DISCONTINUED | OUTPATIENT
Start: 2024-05-03 | End: 2024-05-04 | Stop reason: HOSPADM

## 2024-05-02 RX ORDER — CEFAZOLIN 1 G/1
INJECTION, POWDER, FOR SOLUTION INTRAVENOUS AS NEEDED
Status: DISCONTINUED | OUTPATIENT
Start: 2024-05-02 | End: 2024-05-02

## 2024-05-02 RX ORDER — ACETAMINOPHEN 325 MG/1
975 TABLET ORAL EVERY 6 HOURS
Status: DISCONTINUED | OUTPATIENT
Start: 2024-05-02 | End: 2024-05-04 | Stop reason: HOSPADM

## 2024-05-02 RX ORDER — POLYETHYLENE GLYCOL 3350 17 G/17G
17 POWDER, FOR SOLUTION ORAL 2 TIMES DAILY PRN
Status: DISCONTINUED | OUTPATIENT
Start: 2024-05-02 | End: 2024-05-04 | Stop reason: HOSPADM

## 2024-05-02 RX ORDER — LABETALOL HYDROCHLORIDE 5 MG/ML
20 INJECTION, SOLUTION INTRAVENOUS ONCE AS NEEDED
Status: DISCONTINUED | OUTPATIENT
Start: 2024-05-02 | End: 2024-05-04 | Stop reason: HOSPADM

## 2024-05-02 RX ORDER — SIMETHICONE 80 MG
80 TABLET,CHEWABLE ORAL 4 TIMES DAILY PRN
Status: DISCONTINUED | OUTPATIENT
Start: 2024-05-02 | End: 2024-05-04 | Stop reason: HOSPADM

## 2024-05-02 RX ORDER — DIPHENHYDRAMINE HCL 25 MG
25 CAPSULE ORAL EVERY 4 HOURS PRN
Status: DISCONTINUED | OUTPATIENT
Start: 2024-05-02 | End: 2024-05-04 | Stop reason: HOSPADM

## 2024-05-02 RX ORDER — TRANEXAMIC ACID 100 MG/ML
1000 INJECTION, SOLUTION INTRAVENOUS ONCE AS NEEDED
Status: DISCONTINUED | OUTPATIENT
Start: 2024-05-02 | End: 2024-05-04 | Stop reason: HOSPADM

## 2024-05-02 RX ORDER — ONDANSETRON 4 MG/1
4 TABLET, FILM COATED ORAL EVERY 6 HOURS PRN
Status: DISCONTINUED | OUTPATIENT
Start: 2024-05-02 | End: 2024-05-04 | Stop reason: HOSPADM

## 2024-05-02 RX ORDER — MISOPROSTOL 200 UG/1
800 TABLET ORAL ONCE AS NEEDED
Status: DISCONTINUED | OUTPATIENT
Start: 2024-05-02 | End: 2024-05-04 | Stop reason: HOSPADM

## 2024-05-02 RX ORDER — BISACODYL 10 MG/1
10 SUPPOSITORY RECTAL DAILY PRN
Status: DISCONTINUED | OUTPATIENT
Start: 2024-05-02 | End: 2024-05-04 | Stop reason: HOSPADM

## 2024-05-02 RX ORDER — NALOXONE HYDROCHLORIDE 0.4 MG/ML
0.1 INJECTION, SOLUTION INTRAMUSCULAR; INTRAVENOUS; SUBCUTANEOUS EVERY 5 MIN PRN
Status: DISCONTINUED | OUTPATIENT
Start: 2024-05-02 | End: 2024-05-04 | Stop reason: HOSPADM

## 2024-05-02 RX ORDER — LIDOCAINE 560 MG/1
1 PATCH PERCUTANEOUS; TOPICAL; TRANSDERMAL
Status: DISCONTINUED | OUTPATIENT
Start: 2024-05-02 | End: 2024-05-04 | Stop reason: HOSPADM

## 2024-05-02 RX ORDER — NIFEDIPINE 10 MG/1
10 CAPSULE ORAL ONCE AS NEEDED
Status: DISCONTINUED | OUTPATIENT
Start: 2024-05-02 | End: 2024-05-04 | Stop reason: HOSPADM

## 2024-05-02 RX ORDER — DIPHENHYDRAMINE HYDROCHLORIDE 50 MG/ML
25 INJECTION INTRAMUSCULAR; INTRAVENOUS EVERY 4 HOURS PRN
Status: DISCONTINUED | OUTPATIENT
Start: 2024-05-02 | End: 2024-05-04 | Stop reason: HOSPADM

## 2024-05-02 RX ORDER — IBUPROFEN 600 MG/1
600 TABLET ORAL EVERY 6 HOURS
Status: DISCONTINUED | OUTPATIENT
Start: 2024-05-03 | End: 2024-05-04 | Stop reason: HOSPADM

## 2024-05-02 RX ORDER — FENTANYL CITRATE 50 UG/ML
INJECTION, SOLUTION INTRAMUSCULAR; INTRAVENOUS AS NEEDED
Status: DISCONTINUED | OUTPATIENT
Start: 2024-05-02 | End: 2024-05-02

## 2024-05-02 RX ORDER — METHYLERGONOVINE MALEATE 0.2 MG/ML
0.2 INJECTION INTRAVENOUS ONCE AS NEEDED
Status: DISCONTINUED | OUTPATIENT
Start: 2024-05-02 | End: 2024-05-04 | Stop reason: HOSPADM

## 2024-05-02 RX ORDER — AZITHROMYCIN 100 MG/ML
INJECTION, POWDER, LYOPHILIZED, FOR SOLUTION INTRAVENOUS AS NEEDED
Status: DISCONTINUED | OUTPATIENT
Start: 2024-05-02 | End: 2024-05-02

## 2024-05-02 RX ORDER — ACETAMINOPHEN 120 MG/1
SUPPOSITORY RECTAL AS NEEDED
Status: DISCONTINUED | OUTPATIENT
Start: 2024-05-02 | End: 2024-05-02

## 2024-05-02 RX ORDER — MORPHINE SULFATE 0.5 MG/ML
INJECTION, SOLUTION EPIDURAL; INTRATHECAL; INTRAVENOUS AS NEEDED
Status: DISCONTINUED | OUTPATIENT
Start: 2024-05-02 | End: 2024-05-02

## 2024-05-02 RX ORDER — KETOROLAC TROMETHAMINE 30 MG/ML
30 INJECTION, SOLUTION INTRAMUSCULAR; INTRAVENOUS EVERY 6 HOURS
Status: COMPLETED | OUTPATIENT
Start: 2024-05-02 | End: 2024-05-03

## 2024-05-02 RX ORDER — LIDOCAINE HCL/EPINEPHRINE/PF 2%-1:200K
VIAL (ML) INJECTION AS NEEDED
Status: DISCONTINUED | OUTPATIENT
Start: 2024-05-02 | End: 2024-05-02

## 2024-05-02 RX ORDER — HYDRALAZINE HYDROCHLORIDE 20 MG/ML
5 INJECTION INTRAMUSCULAR; INTRAVENOUS ONCE AS NEEDED
Status: DISCONTINUED | OUTPATIENT
Start: 2024-05-02 | End: 2024-05-04 | Stop reason: HOSPADM

## 2024-05-02 RX ORDER — LOPERAMIDE HYDROCHLORIDE 2 MG/1
4 CAPSULE ORAL EVERY 2 HOUR PRN
Status: DISCONTINUED | OUTPATIENT
Start: 2024-05-02 | End: 2024-05-04 | Stop reason: HOSPADM

## 2024-05-02 RX ORDER — KETOROLAC TROMETHAMINE 30 MG/ML
INJECTION, SOLUTION INTRAMUSCULAR; INTRAVENOUS AS NEEDED
Status: DISCONTINUED | OUTPATIENT
Start: 2024-05-02 | End: 2024-05-02

## 2024-05-02 RX ORDER — OXYTOCIN/0.9 % SODIUM CHLORIDE 30/500 ML
60 PLASTIC BAG, INJECTION (ML) INTRAVENOUS ONCE AS NEEDED
Status: DISCONTINUED | OUTPATIENT
Start: 2024-05-02 | End: 2024-05-04 | Stop reason: HOSPADM

## 2024-05-02 RX ORDER — ONDANSETRON HYDROCHLORIDE 2 MG/ML
4 INJECTION, SOLUTION INTRAVENOUS EVERY 6 HOURS PRN
Status: DISCONTINUED | OUTPATIENT
Start: 2024-05-02 | End: 2024-05-04 | Stop reason: HOSPADM

## 2024-05-02 RX ADMIN — ACETAMINOPHEN 975 MG: 325 TABLET ORAL at 22:06

## 2024-05-02 RX ADMIN — DIPHENHYDRAMINE HYDROCHLORIDE 25 MG: 50 INJECTION INTRAMUSCULAR; INTRAVENOUS at 22:26

## 2024-05-02 RX ADMIN — PENICILLIN G 3 MILLION UNITS: 3000000 INJECTION, SOLUTION INTRAVENOUS at 07:54

## 2024-05-02 RX ADMIN — OXYTOCIN 600 MILLI-UNITS/MIN: 10 INJECTION, SOLUTION INTRAMUSCULAR; INTRAVENOUS at 14:36

## 2024-05-02 RX ADMIN — KETOROLAC TROMETHAMINE 30 MG: 30 INJECTION, SOLUTION INTRAMUSCULAR at 15:24

## 2024-05-02 RX ADMIN — LIDOCAINE HYDROCHLORIDE,EPINEPHRINE BITARTRATE 5 ML: 20; .005 INJECTION, SOLUTION EPIDURAL; INFILTRATION; INTRACAUDAL; PERINEURAL at 14:24

## 2024-05-02 RX ADMIN — LIDOCAINE HYDROCHLORIDE,EPINEPHRINE BITARTRATE 5 ML: 20; .005 INJECTION, SOLUTION EPIDURAL; INFILTRATION; INTRACAUDAL; PERINEURAL at 13:54

## 2024-05-02 RX ADMIN — SODIUM CHLORIDE, POTASSIUM CHLORIDE, SODIUM LACTATE AND CALCIUM CHLORIDE: 600; 310; 30; 20 INJECTION, SOLUTION INTRAVENOUS at 15:09

## 2024-05-02 RX ADMIN — FENTANYL CITRATE 100 MCG: 50 INJECTION, SOLUTION INTRAMUSCULAR; INTRAVENOUS at 14:10

## 2024-05-02 RX ADMIN — CARBOPROST TROMETHAMINE 250 MCG: 250 INJECTION, SOLUTION INTRAMUSCULAR at 14:40

## 2024-05-02 RX ADMIN — LIDOCAINE HYDROCHLORIDE,EPINEPHRINE BITARTRATE 5 ML: 20; .005 INJECTION, SOLUTION EPIDURAL; INFILTRATION; INTRACAUDAL; PERINEURAL at 14:00

## 2024-05-02 RX ADMIN — LIDOCAINE HYDROCHLORIDE,EPINEPHRINE BITARTRATE 5 ML: 20; .005 INJECTION, SOLUTION EPIDURAL; INFILTRATION; INTRACAUDAL; PERINEURAL at 13:49

## 2024-05-02 RX ADMIN — LOPERAMIDE HYDROCHLORIDE 4 MG: 2 CAPSULE ORAL at 15:43

## 2024-05-02 RX ADMIN — TRANEXAMIC ACID 1000 MG: 100 INJECTION INTRAVENOUS at 14:40

## 2024-05-02 RX ADMIN — TERBUTALINE SULFATE 0.25 MG: 1 INJECTION SUBCUTANEOUS at 09:30

## 2024-05-02 RX ADMIN — CEFEPIME HYDROCHLORIDE 2 G: 2 INJECTION, POWDER, FOR SOLUTION INTRAVENOUS at 13:55

## 2024-05-02 RX ADMIN — PHENYLEPHRINE HYDROCHLORIDE 0.57 MCG/KG/MIN: 10 INJECTION INTRAVENOUS at 14:19

## 2024-05-02 RX ADMIN — BENZOCAINE AND LEVOMENTHOL 1 APPLICATION: 200; 5 SPRAY TOPICAL at 20:41

## 2024-05-02 RX ADMIN — LEVOTHYROXINE SODIUM 50 MCG: 50 TABLET ORAL at 07:20

## 2024-05-02 RX ADMIN — FAMOTIDINE 20 MG: 10 INJECTION, SOLUTION INTRAVENOUS at 14:22

## 2024-05-02 RX ADMIN — AZITHROMYCIN 500 MG: 500 INJECTION, POWDER, LYOPHILIZED, FOR SOLUTION INTRAVENOUS at 14:06

## 2024-05-02 RX ADMIN — CEFAZOLIN 2 G: 1 INJECTION, POWDER, FOR SOLUTION INTRAMUSCULAR; INTRAVENOUS at 14:00

## 2024-05-02 RX ADMIN — PENICILLIN G 3 MILLION UNITS: 3000000 INJECTION, SOLUTION INTRAVENOUS at 03:41

## 2024-05-02 RX ADMIN — MORPHINE SULFATE 3 MG: 0.5 INJECTION EPIDURAL; INTRATHECAL; INTRAVENOUS at 15:06

## 2024-05-02 RX ADMIN — Medication 1 EACH: at 20:41

## 2024-05-02 RX ADMIN — KETOROLAC TROMETHAMINE 30 MG: 30 INJECTION, SOLUTION INTRAMUSCULAR at 22:06

## 2024-05-02 RX ADMIN — ONDANSETRON 4 MG: 2 INJECTION, SOLUTION INTRAMUSCULAR; INTRAVENOUS at 14:22

## 2024-05-02 RX ADMIN — ACETAMINOPHEN 650 MG: 120 SUPPOSITORY RECTAL at 15:40

## 2024-05-02 RX ADMIN — PENICILLIN G 3 MILLION UNITS: 3000000 INJECTION, SOLUTION INTRAVENOUS at 11:41

## 2024-05-02 ASSESSMENT — PAIN SCALES - GENERAL
PAINLEVEL_OUTOF10: 0 - NO PAIN
PAIN_LEVEL: 0
PAINLEVEL_OUTOF10: 0 - NO PAIN
PAINLEVEL_OUTOF10: 2
PAINLEVEL_OUTOF10: 0 - NO PAIN

## 2024-05-02 ASSESSMENT — PAIN DESCRIPTION - DESCRIPTORS: DESCRIPTORS: ACHING

## 2024-05-02 NOTE — ANESTHESIA POSTPROCEDURE EVALUATION
Patient: Marisabel Segundo    Procedure Summary       Date: 24 Room / Location: Virtual [facility] 2 OB    Anesthesia Start:  Anesthesia Stop: 24 1550    Procedure: OBGYN Delivery  Section Diagnosis: (Arrest of Descent)    Surgeons: Pricilla Garcia MD Responsible Provider: Shelton Palacios DO    Anesthesia Type: epidural ASA Status: 2            Anesthesia Type: epidural    Vitals Value Taken Time   /77 24 1542   Temp 36.8 24 1550   Pulse 104 24 1546   Resp 19 24 1550   SpO2 99 % 24 1546       Anesthesia Post Evaluation    Patient location during evaluation: bedside  Patient participation: complete - patient participated  Level of consciousness: awake and alert  Pain score: 0  Pain management: adequate  Multimodal analgesia pain management approach  Airway patency: patent  Cardiovascular status: acceptable  Respiratory status: acceptable and room air  Hydration status: acceptable  Postoperative Nausea and Vomiting: none        There were no known notable events for this encounter.

## 2024-05-02 NOTE — INDIVIDUALIZED OVERALL PLAN OF CARE NOTE
House staff to patient bedside for variable decelerations  Patient resting in bed. Reports good pain control with epidural .  Pitocin infusing  Cervical Exam  Dilation: 9.5  Effacement (%): 100  Fetal Station: 1  OB Examiner: Zonia NAVARRO  Fetal Assessment  Movement: Present  Mode: Fetal scalp electrode  Baseline Fetal Heart Rate (bpm): 135 bpm  Baseline Classification: Normal  Variability: Moderate (Between 6 and 25 BPM)  Pattern: Accelerations  FHR Category: Category I      Contraction Frequency: 2-5    Plan:  - 500 ml LR bolus  - Continue to monitor    Discussed with L&D Chief Dr. Evelyn Brar MD PGY1

## 2024-05-02 NOTE — INDIVIDUALIZED OVERALL PLAN OF CARE NOTE
Patient resting in bed.  Epidural infusing from 18  Cervical Exam  Dilation: 9  Effacement (%): 100  Fetal Station: 0  OB Examiner: Zonia NAVARRO  Fetal Assessment  Movement: Present  Mode: Fetal scalp electrode  Baseline Fetal Heart Rate (bpm): 145 bpm  Baseline Classification: Normal  Variability: Moderate (Between 6 and 25 BPM)  Pattern: Accelerations  FHR Category: Category I      Contraction Frequency: 1.5-4      Buffy Brar MD PGY1

## 2024-05-02 NOTE — INDIVIDUALIZED OVERALL PLAN OF CARE NOTE
FSE placement:    House staff to patient bedside for inability to effectively trace FHR.  Patient resting in bed. Reports good pain control with epidural   Cervical Exam  Dilation: 8  Effacement (%): 100  Fetal Station: 0  OB Examiner: Zonia NAVARRO  Fetal Assessment  Movement: Present  Mode: Fetal scalp electrode  Baseline Fetal Heart Rate (bpm): 135 bpm (Baseline change at 0157 to 150bpm)  Baseline Classification: Normal  Variability: Moderate (Between 6 and 25 BPM)  Pattern: Decelerations, unspecified, Accelerations, Variable decelerations  FHR Category: Category I      Contraction Frequency: 1-3    Discussed with Ludivina Vieira, APRN-CNM    Buffy Brar MD PGY1

## 2024-05-02 NOTE — INDIVIDUALIZED OVERALL PLAN OF CARE NOTE
House staff to patient bedside for pelvic pressure.  Good pain control with epidural.  Pit infusing from 16  Cervical Exam  Dilation: 7  Effacement (%): 100  Fetal Station: 0  OB Examiner: Zonia NAVARRO  Fetal Assessment  Movement: Present  Mode: External US  Baseline Fetal Heart Rate (bpm): 140 bpm  Baseline Classification: Normal  Variability: Moderate (Between 6 and 25 BPM)  Pattern: Accelerations, Variable decelerations  FHR Category: Category I      Contraction Frequency: 1-3.5    Buffy Brar MD PGY1

## 2024-05-02 NOTE — L&D DELIVERY NOTE
OB Delivery Note  2024  Marisabel Segundo  34 y.o.   , Low Transverse        Gestational Age: 40w0d  /Para:   Quantitative Blood Loss: Admission to Discharge: 1464 mL (2024 11:11 AM - 2024  4:59 PM)    Irina Segundo [[id number]]      Labor Events    Sac identifier: Sac 1  Rupture date/time: 2024 1610  Rupture type: Artificial  Fluid color: Clear  Fluid odor: None  Labor type: Induced Onset of Labor  Labor allowed to proceed with plans for an attempted vaginal birth?: Yes  Induction: Oxytocin  Induction date/time: 2024  Induction indications: Hypertensive Disorder of Pregnancy, Diabetes  Complications: Failure to Progress in Second Stage       Labor Event Times    Labor onset date/time: 2024  Dilation complete date/time: 2024  Start pushing date/time: 2024  Decision date/time (emergent ): 2024 125       Placenta    Placenta delivery date/time: 2024  Placenta removal: Manual removal  Placenta appearance: Intact  Placenta disposition: pathology       Cord    Vessels: 3 vessels  Complications: None  Delayed cord clamping?: No  Cord clamped date/time: 2024  Cord blood disposition: Lab  Gases sent?: No  Stem cell collection (by provider): No       Lacerations    Episiotomy: None  Perineal laceration: None  Other lacerations?: No  Repair suture: None       Anesthesia    Method: Epidural, Spinal       Operative Delivery    Forceps attempted?: No  Vacuum extractor attempted?: No       Shoulder Dystocia    Shoulder dystocia present?: No        Delivery    Time head delivered: 2024 14:34:00  Birth date/time: [birth year]24 14:34:00  Delivery type: , Low Transverse   categorization: primary   priority: routine  Indications for : Arrest of Descent  Incision type: low transverse  Complications: Failure to Progress in Second Stage       Resuscitation    Method: Suctioning, Tactile stimulation        Apgars    Living status: Living  Apgar Component Scores:  1 min.:  5 min.:  10 min.:  15 min.:  20 min.:    Skin color:  1  1       Heart rate:  2  2       Reflex irritability:  2  2       Muscle tone:  2  2       Respiratory effort:  2  2       Total:  9  9       Apgars assigned by: ABBY RAND       Delivery Providers    Delivering clinician: Pricilla Garcia MD   Provider Role    Sunshine Baum, RN Delivery Nurse    Rhea Don, RN Nursery Nurse    Kala Aleman MD Resident    Sophia Steven MD Resident               Operative Dictation  Pre op diagnosis: Arrest of descent, gHTN, GMDA1  Post op diagnosis: Same    Findings: Normal appearing gravid uterus, fallopian tubes, and ovaries. Amniotic fluid clear, male infant in cephalic presentation    Procedure: Patient was taken to the OR where combined spinal epidural anesthesia was administered/epidural anesthesia was redosed.  She was then placed in the dorsal supine position with a left lateral tilt. A agrawal catheter was placed, SCDs were applied, and a vaginal prep was performed. A pre-procedure time out was performed.  The patient was given prophylactic dose of IV antibiotics. She was then prepped and draped in the usual sterile fashion. A Pfannenstiel skin incision was made through the skin with the scalpel and then carried through the subcutaneous fat to the underlying fascial layer with sharp dissection. The fascia was incised on either side of the midline with the scalpel, and fascia was then dissected off the rectus muscle bilaterally using sharp dissection with curved Hinse scissorswith clear visualization inferiorly using Sherlyn clamps. The superior aspect of the incision was grasped, tented up with Kocher clamps and the rectus muscle was dissected off bluntly. The muscles were divided in the midline, the peritoneum was identified and then entered sharply with Metzenbaum scissors, and incision extended superiorly and inferiorly  with good visualization of bladder below. A bladder flap was created using Metzenbaum scissors, then the bladder blade was inserted. A low transverse uterine incision was made with the scalpel, the uterine cavity was entered, and the hysterotomy was extended bilaterally with cephalocaudal stretching. The infant was delivered atraumatically, the cord was clamped and cut, and infant was handed off to the awaiting nursing staff. The placenta was then expressed. The uterus was exteriorized and cleared of all clot and debris. Extension was noted into the bilateral broad ligaments. The uterine incision was repaired using a running stitch of 0-Vicryl. A second layer of the same suture was placed in an imbricating fashion. The broad ligaments were repaired with 2-0 monocryl with good hemostasis. The uterus was then placed back inside the pelvis, the gutters were cleared of all clots and debris. The hysterotomy was again evaluated and found to be hemostatic, and the underside of the fascia and bladder and the rectus muscles were also found to be hemostatic. The fascia was closed using a running stitch of 0-VIcryl. The subcutaneous layer was irrigated, small bleeding vessels were cauterized, and the subcutaneous layer was reapproximated using a running stitch of 2-0 Monocryl. The skin was closed with 4-0 Monocryl.  All counts were correct, the patient tolerated the procedure well. The patient and infant were taken back to the recovery room in stable condition.     Dr. Garcia was present for all key portions of the procedure.     Sophia Steven MD  PGY-1, Labor and Delivery

## 2024-05-02 NOTE — INDIVIDUALIZED OVERALL PLAN OF CARE NOTE
House staff to patient bedside for pelvic pressure.  Good pain control with epidural.  Pit infusing from 16    Cervical Exam  Dilation: 6  Effacement (%): 100  Fetal Station: -1  OB Examiner: Zonia NAVARRO  Fetal Assessment  Movement: Present  Mode: External US  Baseline Fetal Heart Rate (bpm): 135 bpm  Baseline Classification: Normal  Variability: Moderate (Between 6 and 25 BPM)  Pattern: Accelerations, Variable decelerations  FHR Category: Category I      Contraction Frequency: 1-2.5    Buffy Brar MD PGY1

## 2024-05-02 NOTE — PROGRESS NOTES
Intrapartum Progress Note    Assessment/Plan   Marisabel Segundo is a 34 y.o.  at 40w0d. JOSIAH: 2024, by Embryo Transfer.     A: IOL, active phase  Cat 1 fht  CPAM  GBS Pos  GHTN    P: Plan to recheck cervix in one hour, will attempt to reduce cervix with pushing at that time, if anterior lip is still present  Continue oxytocin per protocol  Encouraged frequent position changes  Will plan to call NICU to bedside at delivery  Anticipate SVB    SOLE Mendenhall      Principal Problem:    Encounter for induction of labor ([facility])    Pregnancy Problems (from 23 to present)       Problem Noted Resolved    Encounter for induction of labor ([facility]) 2024 by SOLE Mendenhall No    Priority:  Medium      Diet controlled gestational diabetes mellitus (GDM) in third trimester ([facility]) 4/15/2024 by Lyla Soto RN No    Priority:  Medium      Overview Signed 4/15/2024 11:24 AM by Lyla Soto RN     4/15/2024 : nutrition           Elevated blood pressure affecting pregnancy in third trimester, antepartum ([facility]) 3/7/2024 by Vanesa Sierra MD No    Priority:  Medium      Overview Signed 3/22/2024  6:42 AM by Lottie Mcgrath MD     -/90 at 32wga         Pregnancy complicated by fetal lung lesion ([facility]) 2024 by SOLE Ott No    Priority:  Medium      Overview Addendum 4/3/2024  3:28 PM by Lyla Soto RN     Growth US at 32 and 36 weeks.   MFM referral placed  Nutrition counseling scheduled    3/14/2024 : nutrition only  3/28/2024 Nutrition plan alone   4/3/2024 : Nutrition plan         Encounter for supervision of normal first pregnancy in first trimester ([facility]) 10/10/2023 by Suzi Hart MD No    Priority:  Medium      Overview Addendum 2024 11:43 PM by SOLE Govea       Dating:   [x] Initial BMI: 24  [x] Prenatal Labs:   [x] Genetic Screening:  -s/p risk-reducing cfDNA, normal NT, and normal anatomy  [x] Baby ASA:  [x] Anatomy  US:  [x] 1hr GCT at 24-28wks:  [x] Tdap (27-36wks):  [x] Flu Shot: 9/2023  [x] COVID vaccine:   [x] Rhogam (if Rh neg): not indicated   [x] GBS at 36 wks: positive  [] Breastfeeding  [] Postpartum Birth control method:   [x] 39 weeks discussion of IOL vs. Expectant management: IOL scheduled at [facility] at 39.6   [x] Mode of delivery: vaginal          11 weeks gestation of pregnancy ([facility]) 10/10/2023 by Suzi Hart MD 12/5/2023 by Lottie Mcgrath MD            Subjective   Pt is resting in a left lateral position with peanut ball. She endorses comfort with her epidural.     Objective   Last Vitals:  Temp Pulse Resp BP MAP Pulse Ox   36.3 °C (97.3 °F) 71 20 138/80   99 %     Vitals Min/Max Last 24 Hours:  Temp  Min: 36.3 °C (97.3 °F)  Max: 37 °C (98.6 °F)  Pulse  Min: 61  Max: 83  Resp  Min: 16  Max: 20  BP  Min: 120/71  Max: 179/89    Intake/Output:    Intake/Output Summary (Last 24 hours) at 5/2/2024 0800  Last data filed at 5/2/2024 0546  Gross per 24 hour   Intake 1676.88 ml   Output 2800 ml   Net -1123.12 ml       Physical Examination:  GENERAL: Examination reveals a well developed, well nourished, gravid female in no acute distress. She is alert and cooperative.  HEENT:  normocephalic, atraumatic  LUNGS:  regular, unlabored respirations  FHR is  135bpm, mod variability, + accels, - decels  CERVIX: not evaluated; MEMBRANES are  (leaking)  NEUROLOGICAL: alert, oriented, normal speech, no focal findings or movement disorder noted  PSYCHOLOGICAL: awake and alert; oriented to person, place, and time    Lab Review:  Labs in chart were reviewed.

## 2024-05-02 NOTE — SIGNIFICANT EVENT
Decision for     Called to bedside to discuss  delivery in the setting of arrest of descent. Pt pushing for approx 3 hours with no advancement in fetal station. Previously discussed with Armida May CNM regarding continuing with attempt at vaginal delivery versus  birth. See previous progress note from 1300 . Discussed r/b/a of primary  delivery, all patient questions answered and patient expressed understanding. Pt opting for primary LTCS at this time. Pitocin drip turned off.    Seen and discussed with Dr. Garcia.   Sophia Steven MD PGY-1

## 2024-05-02 NOTE — PROGRESS NOTES
0900 SVE anterior lip / 100 / +1  Able to reduce cervix with pushing efforts, pt pushed to +2 station.    While pushing, fht with 7min prolonged deceleration to mercedes of 50bpm at 0926.  Pitocin off, IV fluid bolus administered, and pt repositioned to hands-and-knees.  ABIODUN Aleman and RUTHY Garcia to bedside for evaluation.  Terbutaline administered at 0930.    Fht recovered to baseline of 130bpm with moderate variability at 0933.  Will allow pt to rest for about 10 minutes, then will resume pushing efforts if fht remains Category I.   Will consider restarting oxytocin after 30 minutes, if needed for contraction frequency.     Armida May, DARNELL-YURIDIAM

## 2024-05-02 NOTE — INDIVIDUALIZED OVERALL PLAN OF CARE NOTE
Patient resting in bed. Reports good pain control with epidural    Vitals:    05/01/24 2040   BP: (!) 142/86   Pulse: 67   Resp: 18   Temp: 36.4 °C (97.5 °F)   SpO2: 100%     Cervical Exam  Dilation: 5  Effacement (%): 100  Fetal Station: -1  OB Examiner: Zonia NAVARRO  Fetal Assessment  Movement: Present  Mode: External US  Baseline Fetal Heart Rate (bpm): 130 bpm  Baseline Classification: Normal  Variability: Moderate (Between 6 and 25 BPM)  Pattern: Accelerations  FHR Category: Category I      Contraction Frequency: 1.5-3    Buffy Brar MD PGY1

## 2024-05-02 NOTE — CARE PLAN
The patient's goals for the shift include  manage labor pain, maintain reassuring FHT.    The clinical goals for the shift include maintain reassuring FHT and maternal HTN sx. Manage labor pain

## 2024-05-02 NOTE — PROGRESS NOTES
S: Pt pushing for 2.5 hours. Starting to get fatigued, states she feels discouraged. Still comfortable with epidural.    O: FHR 140bpm, mod variability, accels present, one variable decel noted in past 30min  [place] q2-9min  Fetal occiput still at +2 station  Oxytocin infusing at 20mu    A: IUP @ 40.0  IOL, second stage  ROM x21 hours  Cat 2 fht for one variable deceleration, otherwise reassuring with moderate variability and accelerations  GHTN  GDMA2    P: RUTHY Garcia and ABIODUN Aleman called to bedside for evaluation  Per RUTHY Garcia, fetus feels asynclitic in the pelvis. Attempts to manually correct fetal position were unsuccessful. Fetal occiput is too high to safely offer a vacuum-assisted vaginal birth, per Dr. Garcia.   Advised patient that given asynclitism, additional pushing is unlikely to facilitate progression towards a vaginal birth.   Offered  delivery now, however given reassuring fht, advised patient that she may continue to push longer if desired  Pt requests time alone to discuss with her family before deciding.    DARNELL Mendenhall-ANNITA

## 2024-05-02 NOTE — DISCHARGE INSTRUCTIONS

## 2024-05-02 NOTE — PROGRESS NOTES
S: Pt has resumed pushing with contractions since 1025. Semi-fowlers, side-lying, and tug-of-war pushing all tried. She is comfortable with her epidural.    O: FHR 140bpm, mod variability, accels present, one variable deceleration noted with mercedes of 80bpm x 60 seconds  [place] q3-4.5min  Fetal occiput at +2 station, caput noted  Oxytocin infusing at 16mu    A: IUP @ 40.0  IOL, second stage  ROM x19.5hrs  Cat 2 fht for variable deceleration, overall reassuring with moderate variability  GHTN  GDMA2  Fetal CPAM    P: Midwives VERA May and ABIODUN Salamanca continue pushing efforts at bedside  MD team updated on patient progress  Continue titrating up on oxytocin per protocol  Anticipate SVB, will call NICU to bedside for delivery    SOLE Mendenhall

## 2024-05-02 NOTE — INDIVIDUALIZED OVERALL PLAN OF CARE NOTE
House staff to patient bedside for pelvic pressure  Patient resting in bed. Reports good pain control with epidural     Cervical Exam  Dilation: 9  Effacement (%): 100  Fetal Station: 0  OB Examiner: cindy NAVARRO  Fetal Assessment  Movement: Present  Mode: Fetal scalp electrode  Baseline Fetal Heart Rate (bpm): 140 bpm  Baseline Classification: Normal  Variability: Moderate (Between 6 and 25 BPM)  Pattern: Accelerations, Variable decelerations  FHR Category: Category I      Contraction Frequency: 1.5-4    Buffy Brar MD PGY1

## 2024-05-03 LAB
ERYTHROCYTE [DISTWIDTH] IN BLOOD BY AUTOMATED COUNT: 14.2 % (ref 11.5–14.5)
HCT VFR BLD AUTO: 25 % (ref 36–46)
HGB BLD-MCNC: 8.1 G/DL (ref 12–16)
MCH RBC QN AUTO: 31.6 PG (ref 26–34)
MCHC RBC AUTO-ENTMCNC: 32.4 G/DL (ref 32–36)
MCV RBC AUTO: 98 FL (ref 80–100)
NRBC BLD-RTO: 0 /100 WBCS (ref 0–0)
PLATELET # BLD AUTO: 118 X10*3/UL (ref 150–450)
RBC # BLD AUTO: 2.56 X10*6/UL (ref 4–5.2)
WBC # BLD AUTO: 12.5 X10*3/UL (ref 4.4–11.3)

## 2024-05-03 PROCEDURE — 2500000004 HC RX 250 GENERAL PHARMACY W/ HCPCS (ALT 636 FOR OP/ED)

## 2024-05-03 PROCEDURE — 85027 COMPLETE CBC AUTOMATED: CPT

## 2024-05-03 PROCEDURE — 2500000001 HC RX 250 WO HCPCS SELF ADMINISTERED DRUGS (ALT 637 FOR MEDICARE OP)

## 2024-05-03 PROCEDURE — 1100000001 HC PRIVATE ROOM DAILY

## 2024-05-03 PROCEDURE — 36415 COLL VENOUS BLD VENIPUNCTURE: CPT

## 2024-05-03 PROCEDURE — 2500000005 HC RX 250 GENERAL PHARMACY W/O HCPCS

## 2024-05-03 RX ADMIN — KETOROLAC TROMETHAMINE 30 MG: 30 INJECTION, SOLUTION INTRAMUSCULAR at 04:14

## 2024-05-03 RX ADMIN — BENZOCAINE AND LEVOMENTHOL 1 APPLICATION: 200; 5 SPRAY TOPICAL at 16:20

## 2024-05-03 RX ADMIN — ENOXAPARIN SODIUM 40 MG: 100 INJECTION SUBCUTANEOUS at 04:13

## 2024-05-03 RX ADMIN — IBUPROFEN 600 MG: 600 TABLET, FILM COATED ORAL at 21:34

## 2024-05-03 RX ADMIN — Medication 1 EACH: at 16:20

## 2024-05-03 RX ADMIN — POLYETHYLENE GLYCOL 3350 17 G: 17 POWDER, FOR SOLUTION ORAL at 11:51

## 2024-05-03 RX ADMIN — ACETAMINOPHEN 975 MG: 325 TABLET ORAL at 04:14

## 2024-05-03 RX ADMIN — ACETAMINOPHEN 975 MG: 325 TABLET ORAL at 10:14

## 2024-05-03 RX ADMIN — ACETAMINOPHEN 975 MG: 325 TABLET ORAL at 21:34

## 2024-05-03 RX ADMIN — KETOROLAC TROMETHAMINE 30 MG: 30 INJECTION, SOLUTION INTRAMUSCULAR at 10:12

## 2024-05-03 RX ADMIN — ACETAMINOPHEN 975 MG: 325 TABLET ORAL at 16:19

## 2024-05-03 RX ADMIN — DIPHENHYDRAMINE HYDROCHLORIDE 25 MG: 50 INJECTION INTRAMUSCULAR; INTRAVENOUS at 04:12

## 2024-05-03 RX ADMIN — IBUPROFEN 600 MG: 600 TABLET, FILM COATED ORAL at 16:19

## 2024-05-03 ASSESSMENT — PAIN - FUNCTIONAL ASSESSMENT: PAIN_FUNCTIONAL_ASSESSMENT: 0-10

## 2024-05-03 ASSESSMENT — PAIN SCALES - GENERAL
PAINLEVEL_OUTOF10: 2
PAINLEVEL_OUTOF10: 0 - NO PAIN

## 2024-05-03 ASSESSMENT — PAIN DESCRIPTION - LOCATION: LOCATION: VAGINA

## 2024-05-03 NOTE — PROGRESS NOTES
SW attempted visit, but patient has visitors at bedside.  SW agreed to return at a later time.    Addendum: Second attempt made to complete assessment.  Mother of baby was in the restroom per spouse.  PPD resources left at bedside.  Chart review completed with no acute concerns noted.  SW consult placed due to patient's history of anxiety.      Plan:  Ms. Segundo is clear from SW perspective.  Please place new consult if new concerns arise.         Meredith Don MSSA LSW

## 2024-05-03 NOTE — PROGRESS NOTES
Postpartum Progress Note      Assessment/Plan       Marisabel Segundo is a 34 y.o., , who initially presented for IOL. She had a , Low Transverse  delivery on 2024  at 40w1d and is now POD.      Post-op , Low Transverse   - continue routine postoperative care  - pain well controlled on ERAS protocol  - Pt's blood type is AB POS.  Rhogam is not indicated.  - DVT Score: 5 SCDs and enoxaparin prophylactic dose daily while inpatient    Acute blood loss anemia  - Hg  14.0 --> EBL 1400 --> 8.1  - asymptomatic, to start PO Fe on discharge   - s/p Hemabate, TXA, Cytotec  Results from last 7 days   Lab Units 24  0441 24  1146   HEMOGLOBIN g/dL 8.1* 14.0        gHTN  - dx by elevated BP readings >4 hrs apart (1st 3/7)  - 1 isolated severe range reading   - HELLP labs negative, P:C 0.17  - asymptomatic   - normotensive x24 hrs  - discussed 3 day stay for BP, pt verbalizes understanding   - Reviewed signs of elevated BP, appropriate BP parameters and how to monitor BP at home   - Outpatient follow-up in 3 days for BP check     Subclinical hypothyroid  - Levothyroxine 50mcg daily   - last TSH 1.2 on 24  - follow up with primary/endocrinologist post partum for further management    GDMA1  - to have 1hr OGTT at post partum visit     Chronic hep B  - hep B core Ab positive on 2024  - viral load undetectable third trimester, s/p GI consult    Contraception  Defers contraception to primary OB/PP visit. We discussed pregnancy spacing of at least one year, abstaining from intercourse for 6wks, and the ability to become pregnant in the absence of regular menses. Pt verbalized understanding.     Maternal Well-Being  - emotional support provided  - bonding with infant  -  feeding: breastfeeding/pumping encouraged; lactation consult prn     Dispo  - anticipate d/c on PPD#3 if meeting all post-op and postpartum milestones     - The signs and symptoms of PEC were reviewed with the patient,  including unrelenting headache, vision changes/blurred vision, and RUQ pain  - BP cuff for home for checking BP twice a day  - Pt instructed to call primary OB if SBP > 160 or DBP > 110 or if development of PEC symptoms   - On discharge, follow up with primary OB in:       - 2-5 days for BP check       - 4-6 weeks for post-partum visit       Principal Problem:    Encounter for induction of labor ([facility])        Subjective   Her pain is well controlled with current medications  She is passing flatus  She is ambulating independently  She is tolerating a Adult diet Regular  She is voiding spontaneously  She reports no breast or nursing problems  She denies emotional concerns today         Objective   Last Vitals:  Temp Pulse Resp BP MAP Pulse Ox   36.9 °C (98.4 °F) 93 16 132/77   96 %       Physical Exam:  General: well appearing, well nourished, postpartum  Obstetric: fundus firm at umbilicus, lochia light  Skin: Warm, dry; no rashes/lesions/erythema  Abdominal: incision well approximated without drainage or surrounding erythema  Breast: No masses, nipple discharge  Neuro: A/Ox3, no gross motor deficit   GI: no distension, appropriately tender, soft  Respiratory: Even and unlabored on RA  Cardiovascular: 1+ BLE edema; No erythema, warmth  Psych: appropriate mood and affect      Lab Data:  Lab Results   Component Value Date    WBC 12.5 (H) 05/03/2024    HGB 8.1 (L) 05/03/2024    HCT 25.0 (L) 05/03/2024     (L) 05/03/2024

## 2024-05-03 NOTE — LACTATION NOTE
This note was copied from a baby's chart.  Lactation Consultant Note  Lactation Consultation  Reason for Consult: Initial assessment  Consultant Name: KRISTA Ocampo    Maternal Information  Has mother  before?: No  Infant to breast within first 2 hours of birth?: Yes  Exclusive Pump and Bottle Feed: No    Maternal Assessment  Breast Assessment: Medium, Symmetrical, Soft, Compressible  Nipple Assessment: Intact, Erect  Areola Assessment: Normal    Infant Assessment  Infant Behavior: Quiet alert, Readiness to feed, Feeding cues observed, Rooting response, Sucking  Infant Assessment: Other (Comment) (deferred)    Feeding Assessment  Nutrition Source: Breastmilk  Feeding Method: Nursing at the breast  Feeding Position: Skin to skin, One side per feeding, Nipple to nose, Mother needs assistance with latch/positioning  Suck/Feeding: Sustained, Coordinated suck/swallow/breathe, Baby led rhythmically  Latch Assessment: Minimal assistance is needed, Instructed on deep latch, Eagerly grasped on to latch, Areolar attachment, Deep latch obtained, Wide open mouth < 160, Sucking and swallowing, Chin and lower lip contact breast first, Bursts of sucking, swallowing, and rest, Chin moves in rhythmic motion, Comfortable latch    LATCH TOOL  Latch: Grasps breast, tongue down, lips flanged, rhythmic sucking  Audible Swallowing: Spontaneous and intermittent (24 hours old)  Type of Nipple: Everted (After stimulation)  Comfort (Breast/Nipple): Soft/non-tender  Hold (Positioning): Minimal assist, teach one side, mother does other, staff holds  LATCH Score: 9    Breast Pump       Other OB Lactation Tools       Patient Follow-up  Inpatient Lactation Follow-up Needed : Yes    Other OB Lactation Documentation  Maternal Risk Factors: Age over 30, primiparity, Diabetes (gestational, types 1 or 2), Hypertension,  delivery    Recommendations/Summary    This mother called out for assistance with latching her infant to the  breast. Her infant was still 24 hours of life. She stated that her infant had latched well soon after delivery, but was sleepy and spitty during the night. I reviewed early  feeding patterns and behaviors. The infant was awake while I was in the room, but seemed to dealing with mucous and not interested in latching to the breast. He was placed skin to skin with his mother. After about 15 minutes, he began demonstrating feeding cues and attempting to latch to the breast. The mother was assisted to latch using a right cross-cradle hold with pillow support. We reviewed correct infant body alignment, proper head/breast support, positioning the infant's nose opposite the maternal nipple, and bringing the infant to the breast with a wide, open mouth. The infant was brought to the breast and latched, however, not as deeply as is optimal. The mother denied any discomfort and desired to keep the infant at the breast. She was shown how to use breast massage to keep her infant actively nursing at the breast.    We discussed the following breastfeeding topics: early milk production; the benefits of skin to skin for breastfeeding; the importance of a deep latch for maternal comfort and optimal milk production/transfer; frequent feeds with goal of 8-12 feeds/24 hours; alternating starting breast and allowing the infant to end the feeding; and, the rationale for delaying pumping (unless clinically indicated) and pacifier use until breastfeeding is well-established.    The mother was given written information on outpatient lactation services. She has a breast pump for home use. All questions were answered and she is encouraged to call for assistance as needed.

## 2024-05-03 NOTE — SIGNIFICANT EVENT
Patient meets criteria for home monitoring of blood pressure post discharge.  Reason:  current gestational hypertension. Met with patient to assess for availability of home BP monitor.  Patient stated she owns home BP monitor. . Patient educated on importance of continuing to monitor BP at home, recording BP on home monitoring log and s/sx of when to call her provider.  Pt verbalized understanding the above information.

## 2024-05-04 VITALS
OXYGEN SATURATION: 97 % | HEIGHT: 66 IN | TEMPERATURE: 97.5 F | WEIGHT: 193.12 LBS | HEART RATE: 80 BPM | DIASTOLIC BLOOD PRESSURE: 72 MMHG | RESPIRATION RATE: 16 BRPM | BODY MASS INDEX: 31.04 KG/M2 | SYSTOLIC BLOOD PRESSURE: 113 MMHG

## 2024-05-04 PROCEDURE — 2500000001 HC RX 250 WO HCPCS SELF ADMINISTERED DRUGS (ALT 637 FOR MEDICARE OP)

## 2024-05-04 PROCEDURE — 2500000004 HC RX 250 GENERAL PHARMACY W/ HCPCS (ALT 636 FOR OP/ED)

## 2024-05-04 RX ORDER — IBUPROFEN 600 MG/1
600 TABLET ORAL EVERY 6 HOURS
Qty: 120 TABLET | Refills: 0 | Status: SHIPPED | OUTPATIENT
Start: 2024-05-04 | End: 2024-06-03

## 2024-05-04 RX ORDER — OXYCODONE HYDROCHLORIDE 5 MG/1
5 TABLET ORAL EVERY 6 HOURS PRN
Qty: 4 TABLET | Refills: 0 | Status: SHIPPED | OUTPATIENT
Start: 2024-05-04 | End: 2024-05-05

## 2024-05-04 RX ORDER — POLYETHYLENE GLYCOL 3350 17 G/17G
17 POWDER, FOR SOLUTION ORAL DAILY
Qty: 30 PACKET | Refills: 0 | Status: SHIPPED | OUTPATIENT
Start: 2024-05-04 | End: 2024-06-03

## 2024-05-04 RX ORDER — ACETAMINOPHEN 325 MG/1
975 TABLET ORAL EVERY 6 HOURS
Qty: 360 TABLET | Refills: 0 | Status: SHIPPED | OUTPATIENT
Start: 2024-05-04 | End: 2024-06-03

## 2024-05-04 RX ORDER — FERROUS SULFATE 325(65) MG
325 TABLET ORAL
Qty: 30 TABLET | Refills: 0 | Status: SHIPPED | OUTPATIENT
Start: 2024-05-04 | End: 2024-06-03

## 2024-05-04 RX ORDER — LEVOTHYROXINE SODIUM 50 UG/1
50 TABLET ORAL DAILY
Status: DISCONTINUED | OUTPATIENT
Start: 2024-05-04 | End: 2024-05-04 | Stop reason: HOSPADM

## 2024-05-04 RX ADMIN — IBUPROFEN 600 MG: 600 TABLET, FILM COATED ORAL at 10:33

## 2024-05-04 RX ADMIN — ENOXAPARIN SODIUM 40 MG: 100 INJECTION SUBCUTANEOUS at 03:47

## 2024-05-04 RX ADMIN — IBUPROFEN 600 MG: 600 TABLET, FILM COATED ORAL at 03:47

## 2024-05-04 RX ADMIN — ACETAMINOPHEN 975 MG: 325 TABLET ORAL at 03:47

## 2024-05-04 RX ADMIN — ACETAMINOPHEN 975 MG: 325 TABLET ORAL at 10:33

## 2024-05-04 RX ADMIN — LEVOTHYROXINE SODIUM 50 MCG: 50 TABLET ORAL at 08:09

## 2024-05-04 ASSESSMENT — PAIN DESCRIPTION - LOCATION: LOCATION: ABDOMEN

## 2024-05-04 ASSESSMENT — PAIN SCALES - GENERAL
PAINLEVEL_OUTOF10: 3
PAINLEVEL_OUTOF10: 3

## 2024-05-04 NOTE — DISCHARGE SUMMARY
Discharge Summary    Admission Date: 2024  Discharge Date: 24  Discharge Diagnosis: Encounter for induction of labor ([facility])     Patient Active Problem List   Diagnosis    History of hepatitis B virus infection    Female infertility    Subclinical hypothyroidism    Encounter for supervision of normal first pregnancy in first trimester ([facility])    ASCUS of cervix with negative high risk HPV    Pregnancy complicated by fetal lung lesion ([facility])    Elevated blood pressure affecting pregnancy in third trimester, antepartum ([facility])    Congenital pulmonary airway malformation (CPAM)    Diet controlled gestational diabetes mellitus (GDM) in third trimester ([facility])    Conceived by in vitro fertilization    Encounter for induction of labor ([facility])       Hospital Course  Marisabel Segundo is a 34 y.o.,       Initially presented for: rrIOL    Admission Date: 2024    Delivery Date: 2024  2:34 PM     Delivery type: , Low Transverse      GA at delivery: 40w2d    Outcome: Living     Anesthesia during delivery: Epidural;Spinal     Intrapartum complications: Failure to Progress in Second Stage     Feeding method: Breastfeeding Status: Yes    Contraception: Defers contraception to primary OB/PP visit. We discussed pregnancy spacing of at least one year, abstaining from intercourse for 6wks, and the ability to become pregnant in the absence of regular menses. Pt verbalized understanding.     Rhogam: The patient's blood type is AB POS. Rhogam is not indicated.     Now postpartum day: .    Hospital course n/f:      gHTN  - dx by elevated BP readings >4 hrs apart (1st 3/7)  - 1 isolated severe range reading   - HELLP labs negative, P:C 0.17  - asymptomatic   - normotensive x24 hrs  - discussed 3 day stay for BP, pt desires discharge home today   - Reviewed signs of elevated BP, appropriate BP parameters and how to monitor BP at home   - Outpatient follow-up in 3 days for BP check     Subclinical  hypothyroid  - Levothyroxine 50mcg daily   - last TSH 1.2 on 4/5/24  - follow up with primary/endocrinologist post partum for further management     GDMA1  - to have 1hr OGTT at post partum visit      Chronic hep B  - hep B core Ab positive on January 2024  - viral load undetectable third trimester, s/p GI consult     Acute blood loss anemia:  - HMG 14 -> EBL 1400 -> 8.1  - s/p Hemabate, TXA, ctyotec  - asx, to start PO Fe at discharge  Results from last 7 days   Lab Units 05/03/24  0441 05/01/24  1146   HEMOGLOBIN g/dL 8.1* 14.0        PP course otherwise uneventful.  Meeting all postpartum milestones- ambulating independently, passing flatus, tolerating PO intake, lochia light, voiding spontaneously, and pain well controlled with PO meds.       Dispo  - I have reviewed with the patient the standard 3 day stay for hypertensive disorders and the risks/benefits of early discharge, including death, stroke, seizure, and readmission. I strongly recommended that the patient stay for the recommended 3 days. - She verbalizes understanding of risks. She verbalizes understanding of symptoms and BPs that warrant immediate medical attention, and desires discharge home today.       -  The signs and symptoms of PEC were reviewed with the patient, including unrelenting headache, vision changes/blurred vision, and RUQ pain  -  BP cuff for home for checking BP twice a day  -  Pt instructed to call primary OB if SBP > 160 or DBP > 110 or if development of PEC symptoms   - Follow up with primary OB in:       -  2-5 days for BP check       -  2 weeks for incision check       -  4-6 weeks for post-partum visit       Pertinent Physical Exam At Time of Discharge  General: well appearing, well nourished, postpartum  Obstetric: fundus firm below umbilicus, lochia light  Abdominal: incision well approximated w/o s/sx of drainage or infection  Skin: Warm, dry; no rashes/lesions/erythema  Breast: No masses, nipple discharge  Neuro: A&Ox3,  conversational, no gross motor deficit   GI: no distension, appropriately tender, soft  Respiratory: Even and unlabored on RA  Cardiovascular: Trace BLE edema; No erythema, warmth  Psych: appropriate mood and affect          Your medication list        CONTINUE taking these medications        Instructions Last Dose Given Next Dose Due   BD Alcohol Swabs pads, medicated  Generic drug: alcohol swabs      Apply 1 Pad topically 4 times a day.       Blood glucose monitoring meter kit kit      1 each if needed (4 times daily).       lancets misc      1 strip 4 times a day.              ASK your doctor about these medications        Instructions Last Dose Given Next Dose Due   aspirin 81 mg EC tablet           blood sugar diagnostic strip      1 strip 4 times a day.       cholecalciferol 5,000 Units tablet  Commonly known as: Vitamin D-3           levothyroxine 50 mcg tablet  Commonly known as: Synthroid      Take 1 tablet (50 mcg) by mouth once daily in the morning. Take before meals.       PRENATAL 19 ORAL                        Outpatient Follow-Up  Future Appointments   Date Time Provider Department Center   1/8/2025  9:00 AM Renaldo Weller DO [id number]         Janiya Landon, APRN-CNP

## 2024-05-04 NOTE — LACTATION NOTE
Lactation Consultant Note  Lactation Consultation  Reason for Consult: Follow-up assessment  Consultant Name: Mallory Mcneill RN IBCLC    Maternal Information  Has mother  before?: No  Infant to breast within first 2 hours of birth?: Yes  Exclusive Pump and Bottle Feed: No    Maternal Assessment  Breast Assessment: Medium, Symmetrical, Soft  Nipple Assessment: Intact, Erect  Areola Assessment: Normal    Infant Assessment  Infant Behavior: Awake, Rooting response    Feeding Assessment  Nutrition Source: Breastmilk  Feeding Method: Nursing at the breast  Feeding Position: Skin to skin, Cradle  Suck/Feeding: Sustained  Latch Assessment: Bursts of sucking, swallowing, and rest, Flanged lips, Sucks with long jaw movement    Patient Follow-up  Inpatient Lactation Follow-up Needed : No  Outpatient Lactation Follow-up: Recommended  Lactation Professional - OK to Discharge: Yes    Other OB Lactation Documentation  Maternal Risk Factors: Hypertension, Diabetes (gestational, types 1 or 2), Hypothyroidism    Recommendations/Summary  Mom called me in for assistance with a latch prior to their anticipated discharge today. I noted that baby was skin to skin with mom attempting to latch in cross cradle hold to the right breast. I noted that baby's head was positioned too far past the nipple, so that his mouth was not well aligned with the breast. I helped mom to move baby's body towards me slightly so that his mouth was better aligned with the nipple and baby opened wide and latched immediately. Baby latched deeply with flanged lips, areolar attachment, nose and chin touching the breast, with rhythmic sucks and a few swallows noted. Baby fed for about 10 minutes and was continuing to feed as I left the room. Reinforced the need to continue to latch baby every 2-3 hours and to utilize breast compression and infant stimulation techniques to keep baby awake and actively feeding for at least 15-20 mins. Reviewed benefits of skin  to skin contact for mom and baby and for mom's milk production and supply. Reviewed what to expect over the next few days as mom's milk fully comes in. Parents have no further questions at this time. Mom has a pump for home and the outpatient lactation information.

## 2024-05-04 NOTE — CARE PLAN
Problem: Pain - Adult  Goal: Verbalizes/displays adequate comfort level or baseline comfort level  Outcome: Adequate for Discharge     Problem: Safety - Adult  Goal: Free from fall injury  Outcome: Adequate for Discharge     Problem: Pain  Goal: Takes deep breaths with improved pain control throughout the shift  Outcome: Adequate for Discharge  Goal: Turns in bed with improved pain control throughout the shift  Outcome: Adequate for Discharge     Problem: Safety  Goal: Patient will be injury free during hospitalization  Outcome: Adequate for Discharge     Problem: Postpartum  Goal: Experiences normal postpartum course  Outcome: Adequate for Discharge  Goal: Appropriate maternal -  bonding  Outcome: Adequate for Discharge  Goal: Incisions, wounds, or drain sites healing without S/S of infection  Outcome: Adequate for Discharge  Goal: No s/sx infection  Outcome: Adequate for Discharge  Goal: No s/sx of hemorrhage  Outcome: Adequate for Discharge     The patient's goals for the shift include be discharged home.    The clinical goals for the shift include VSS, bleeding and swelling minimal, pain controlled with medications, breastfeeding.

## 2024-05-05 LAB
BLOOD EXPIRATION DATE: NORMAL
DISPENSE STATUS: NORMAL
PRODUCT BLOOD TYPE: 8400
PRODUCT CODE: NORMAL
UNIT ABO: NORMAL
UNIT NUMBER: NORMAL
UNIT RH: NORMAL
UNIT VOLUME: 350
XM INTEP: NORMAL

## 2024-05-06 ENCOUNTER — TELEPHONE (OUTPATIENT)
Dept: OBSTETRICS AND GYNECOLOGY | Facility: CLINIC | Age: 34
End: 2024-05-06
Payer: COMMERCIAL

## 2024-05-06 NOTE — TELEPHONE ENCOUNTER
----- Message from Marisabel Segundo sent at 2024  2:41 PM EDT -----  Regarding: Post-discharge/ maternity leave paperwork  Contact: [phone]  Patel Paris,   Can you help schedule a blood pressure visit within a week as well as a postpartum visit in 4 weeks? My schedule is wide open as I'm now on maternity leave. I'm available to confirm dates/times if needed via phone,  [phone].     Dr. Mcgrath's RN had helped me initially submit required paper work for maternity leave. I'd love to be followed by Ashley Lang at the [facility] office if that is an option. With the arrival of our baby, my work requires confirmation of the birth and paperwork to be completed by my provider. With the change from the [facility] to [facility] office, could someone at the [address] office help with paperwork?      I work for [employer] and with a , they allow 2 additional weeks of paid leave for a total of 22 weeks.     Please let me know as soon as possible as they are holding my paychecks until leave is officially approved once all the paperwork is submitted (which I thought was already approved) :( thank you so much!     Marisabel

## 2024-05-06 NOTE — TELEPHONE ENCOUNTER
Pt contacted.    Had c/s on 5/2/24.   Developed ghtn in labor.  Needs to have a BP check.   Pt transferred to  to schedule.   Pt will bring paperwork in tomorrow to see if we can help with her FMLA/STD

## 2024-05-06 NOTE — TELEPHONE ENCOUNTER
Attempted to call pt to further discuss her [employer] message.   Got her voice mail.    Message left to call office.

## 2024-05-07 ENCOUNTER — POSTPARTUM VISIT (OUTPATIENT)
Dept: OBSTETRICS AND GYNECOLOGY | Facility: CLINIC | Age: 34
End: 2024-05-07
Payer: COMMERCIAL

## 2024-05-07 VITALS
DIASTOLIC BLOOD PRESSURE: 80 MMHG | WEIGHT: 179 LBS | SYSTOLIC BLOOD PRESSURE: 142 MMHG | HEIGHT: 66 IN | BODY MASS INDEX: 28.77 KG/M2

## 2024-05-07 DIAGNOSIS — E07.9 THYROID DISEASE: ICD-10-CM

## 2024-05-07 DIAGNOSIS — Z98.891 HISTORY OF CESAREAN DELIVERY: ICD-10-CM

## 2024-05-07 DIAGNOSIS — Z87.59 HISTORY OF GESTATIONAL HYPERTENSION: Primary | ICD-10-CM

## 2024-05-07 PROBLEM — Z34.01 ENCOUNTER FOR SUPERVISION OF NORMAL FIRST PREGNANCY IN FIRST TRIMESTER (HHS-HCC): Status: RESOLVED | Noted: 2023-10-10 | Resolved: 2024-05-07

## 2024-05-07 PROBLEM — O16.3 ELEVATED BLOOD PRESSURE AFFECTING PREGNANCY IN THIRD TRIMESTER, ANTEPARTUM (HHS-HCC): Status: RESOLVED | Noted: 2024-03-07 | Resolved: 2024-05-07

## 2024-05-07 PROBLEM — Z34.90 ENCOUNTER FOR INDUCTION OF LABOR (HHS-HCC): Status: RESOLVED | Noted: 2024-05-01 | Resolved: 2024-05-07

## 2024-05-07 PROBLEM — O24.410 DIET CONTROLLED GESTATIONAL DIABETES MELLITUS (GDM) IN THIRD TRIMESTER (HHS-HCC): Status: RESOLVED | Noted: 2024-04-15 | Resolved: 2024-05-07

## 2024-05-07 PROBLEM — O35.CXX0: Status: RESOLVED | Noted: 2024-02-23 | Resolved: 2024-05-07

## 2024-05-07 LAB
LABORATORY COMMENT REPORT: NORMAL
PATH REPORT.FINAL DX SPEC: NORMAL
PATH REPORT.GROSS SPEC: NORMAL
PATH REPORT.RELEVANT HX SPEC: NORMAL
PATH REPORT.TOTAL CANCER: NORMAL

## 2024-05-07 PROCEDURE — 0503F POSTPARTUM CARE VISIT: CPT

## 2024-05-07 ASSESSMENT — EDINBURGH POSTNATAL DEPRESSION SCALE (EPDS)
THE THOUGHT OF HARMING MYSELF HAS OCCURRED TO ME: NEVER
I HAVE FELT SAD OR MISERABLE: NO, NOT AT ALL
THINGS HAVE BEEN GETTING ON TOP OF ME: NO, MOST OF THE TIME I HAVE COPED QUITE WELL
I HAVE BEEN ANXIOUS OR WORRIED FOR NO GOOD REASON: HARDLY EVER
I HAVE LOOKED FORWARD WITH ENJOYMENT TO THINGS: AS MUCH AS I EVER DID
I HAVE BEEN SO UNHAPPY THAT I HAVE BEEN CRYING: NO, NEVER
I HAVE FELT SCARED OR PANICKY FOR NO GOOD REASON: NO, NOT MUCH
I HAVE BEEN SO UNHAPPY THAT I HAVE HAD DIFFICULTY SLEEPING: NOT AT ALL
I HAVE BEEN ABLE TO LAUGH AND SEE THE FUNNY SIDE OF THINGS: AS MUCH AS I ALWAYS COULD
TOTAL SCORE: 4
I HAVE BLAMED MYSELF UNNECESSARILY WHEN THINGS WENT WRONG: NOT VERY OFTEN

## 2024-05-07 ASSESSMENT — PAIN SCALES - GENERAL: PAINLEVEL: 4

## 2024-05-07 NOTE — PROGRESS NOTES
Plan    Advised to call office for breast complaints, abnormal bleeding, mood changes, or other concerning symptoms.   Follow up 4-6 weeks   Pt will need 2 hour fasting glucose.  Follow up with endocrine for continued assessment and mgt   Continue to monitor Bps at  home - warning signs reviewed       Diagnoses and all orders for this visit:  History of gestational hypertension  Routine postpartum follow-up ([facility])  History of  delivery  Thyroid disease      Ashley Lang, APRN-CNM    Subjective   34 y.o.  presenting for postpartum follow-up. She is s/p pLTCS following failure to progress in the second stage during a risk reducing IOL.     Delivery Date: 24  Gestational Age: 40.2  Type of Delivery: , Low Transverse      Blood pressures at home have been 130-140's/80-90's . Denied concerning s/s.        Signed             Patient Active Problem List   Diagnosis    History of hepatitis B virus infection    Female infertility    Subclinical hypothyroidism    Encounter for supervision of normal first pregnancy in first trimester ([facility])    ASCUS of cervix with negative high risk HPV    Pregnancy complicated by fetal lung lesion ([facility])    Elevated blood pressure affecting pregnancy in third trimester, antepartum ([facility])    Congenital pulmonary airway malformation (CPAM)    Diet controlled gestational diabetes mellitus (GDM) in third trimester ([facility])    Conceived by in vitro fertilization    Encounter for induction of labor ([facility])         Hospital Course:       gHTN  - dx by elevated BP readings >4 hrs apart (1st 3/7)  - 1 isolated severe range reading   - HELLP labs negative, P:C 0.17  - asymptomatic   - normotensive x24 hrs  - discussed 3 day stay for BP, pt desires discharge home today   - Reviewed signs of elevated BP, appropriate BP parameters and how to monitor BP at home   - Outpatient follow-up in 3 days for BP check     Subclinical hypothyroid  - Levothyroxine 50mcg daily  "  - last TSH 1.2 on 24  - follow up with primary/endocrinologist post partum for further management     GDMA1  - to have 1hr OGTT at post partum visit      Chronic hep B  - hep B core Ab positive on 2024  - viral load undetectable third trimester, s/p GI consult     Acute blood loss anemia:  - HMG 14 -> EBL 1400 -> 8.1  - s/p Hemabate, TXA, ctyotec  - asx, to start PO Fe at discharge                  Concerns: none today     Pain: controlled  Lacerations: n/a  Lochia: mostly resolved   Sexual Intimacy: No  Contraceptive Method: None  Feeding Method: She is breast feeding exclusively. no breast or nursing problems  Lactation Consult Needed?: No    Birth Trauma: No  Bonding with Baby: well with baby boy, Caleb  Mood:   Postpartum Depression: Low Risk  (2024)    [place]  Depression Scale     Last EPDS Total Score: 4     Last EPDS Self Harm Result: Never       Last pap: 2021 ASCUS HPV neg   Objective    /80   Ht 1.676 m (5' 6\")   Wt 81.2 kg (179 lb)   LMP 2023   Breastfeeding Yes   BMI 28.89 kg/m²    Physical Exam  Constitutional:       Appearance: Normal appearance. She is normal weight.   HENT:      Head: Normocephalic.      Nose: Nose normal.      Mouth/Throat:      Mouth: Mucous membranes are moist.      Pharynx: Oropharynx is clear.   Eyes:      Pupils: Pupils are equal, round, and reactive to light.   Pulmonary:      Effort: Pulmonary effort is normal.   Abdominal:      General: Abdomen is flat.      Palpations: Abdomen is soft.   Musculoskeletal:         General: Normal range of motion.      Cervical back: Normal range of motion.   Neurological:      General: No focal deficit present.      Mental Status: She is alert and oriented to person, place, and time. Mental status is at baseline.   Skin:     General: Skin is warm and dry.      Comments: Abd incision site clean, dry, intact. Well approximated, without erythema.    Psychiatric:         Mood and Affect: Mood " normal.         Behavior: Behavior normal.         Thought Content: Thought content normal.         Judgment: Judgment normal.   Vitals reviewed.

## 2024-05-07 NOTE — PROGRESS NOTES
PPV / BP and incision check   EPDS = 4    TYPE OF DEL: C/S  GENDER: Boy  BABY'S NAME: Caleb   BABY WT: 7lb 7oz   COMPLICATIONS: None  CIRCUMCISED: Yes  LIVING AT : Home  FEEDING: Breast and bottle  G : 1  P: 1  BIRTH CONTROL: N/A   SYPMPTOMS: Abd pain  PROBLEMS: Headaches

## 2024-06-14 ENCOUNTER — APPOINTMENT (OUTPATIENT)
Dept: OBSTETRICS AND GYNECOLOGY | Facility: CLINIC | Age: 34
End: 2024-06-14
Payer: COMMERCIAL

## 2024-06-14 ENCOUNTER — LAB (OUTPATIENT)
Dept: LAB | Facility: LAB | Age: 34
End: 2024-06-14
Payer: COMMERCIAL

## 2024-06-14 VITALS
SYSTOLIC BLOOD PRESSURE: 104 MMHG | WEIGHT: 158 LBS | HEIGHT: 66 IN | BODY MASS INDEX: 25.39 KG/M2 | DIASTOLIC BLOOD PRESSURE: 68 MMHG

## 2024-06-14 DIAGNOSIS — Z86.32 HISTORY OF GESTATIONAL DIABETES: ICD-10-CM

## 2024-06-14 DIAGNOSIS — E03.8 SUBCLINICAL HYPOTHYROIDISM: ICD-10-CM

## 2024-06-14 DIAGNOSIS — Z30.011 ENCOUNTER FOR INITIAL PRESCRIPTION OF CONTRACEPTIVE PILLS: ICD-10-CM

## 2024-06-14 DIAGNOSIS — Z12.4 CERVICAL CANCER SCREENING: ICD-10-CM

## 2024-06-14 LAB
ERYTHROCYTE [DISTWIDTH] IN BLOOD BY AUTOMATED COUNT: 12.2 % (ref 11.5–14.5)
HCT VFR BLD AUTO: 39.6 % (ref 36–46)
HGB BLD-MCNC: 12.6 G/DL (ref 12–16)
MCH RBC QN AUTO: 29.7 PG (ref 26–34)
MCHC RBC AUTO-ENTMCNC: 31.8 G/DL (ref 32–36)
MCV RBC AUTO: 93 FL (ref 80–100)
NRBC BLD-RTO: 0 /100 WBCS (ref 0–0)
PLATELET # BLD AUTO: 282 X10*3/UL (ref 150–450)
RBC # BLD AUTO: 4.24 X10*6/UL (ref 4–5.2)
TSH SERPL-ACNC: 1.28 MIU/L (ref 0.44–3.98)
WBC # BLD AUTO: 7.7 X10*3/UL (ref 4.4–11.3)

## 2024-06-14 PROCEDURE — 84443 ASSAY THYROID STIM HORMONE: CPT

## 2024-06-14 PROCEDURE — 87624 HPV HI-RISK TYP POOLED RSLT: CPT

## 2024-06-14 PROCEDURE — 36415 COLL VENOUS BLD VENIPUNCTURE: CPT

## 2024-06-14 PROCEDURE — 85027 COMPLETE CBC AUTOMATED: CPT

## 2024-06-14 RX ORDER — NORETHINDRONE 0.35 MG/1
1 TABLET ORAL DAILY
Qty: 28 TABLET | Refills: 11 | Status: SHIPPED | OUTPATIENT
Start: 2024-06-14 | End: 2025-06-14

## 2024-06-14 ASSESSMENT — PAIN SCALES - GENERAL: PAINLEVEL: 0-NO PAIN

## 2024-06-14 NOTE — PROGRESS NOTES
"IMPRESSIONS:  34 y.o. yo  here for - -     Postpartum examination following pCS, normal recovery course  - Returning to exercise and sex discussed. Patient is cleared. Strengthening pelvic floor with Kegels encouraged before high impact exercise to prevent weakening reviewed.   - Discussed using lubrication with sexual intercourse as estrogen levels are lower r/t breastfeeding and vaginal mucosa may be dry because of this. Patient verbalizes understanding.   - Encouraged patient to continue to monitor for signs or symptoms of  mood and anxiety disorders. She states she has mental health services provided through \"[facility]\" which she can use as needed.   - Routine follow up with PCP for health maintenance examination encouraged   - Encouraged continued breastfeeding. Patient aware resources are available should she need them.   - Warning s/s discussed. All questions answered.    Contraception  - Postpartum contraception discussed - patient elects POP  - Rx Micronor sent to pharmacy. Instructions and warning s/s provided. Patient encouraged to read information packet and be compliant with daily use. Encouraged back-up form of contraception x 48 hrs.   - Discussed with pt that POP must be taken at the same time every day. Pt instructed to use back-up form of contraception for 48 hours if pill is taken 3 or more hours late.    History of GDM  - 2 hour GTT ordered    Cervical cancer screening  - PAP due 2024; done early today  - Reviewed ASCCP guidelines with patient; if nml and HPV neg, next PAP will be due in 5 years    Subclinical hypothyroidism  - Pt still taking 50mcg synthroid daily  - TSH ordered    Anemia  - Pt Hgb dropped from 14.0 on  to 8.1 on 5/3 following  section, secondary to 1400cc blood loss  - Pt has been taking PO iron  - CBC recheck ordered    F/U one year for annual exam, or sooner as needed    SOLE Mendenhall    Subjective     34 y.o. yo  presents for her 6 week " postpartum follow up. She is s/p pCS on 24 for arrest of second stage.     Pregnancy c/b: IVF Pregnancy, Fetal CPAM, subclinical hypothyroidism, GBS Positive, GDMA1, chronic HepB infection, anxiety, and GHTN  Delivery notable for: pCS for arrest of second stage after 3 hours of pushing    PP Recovery:   Feeding - Breastfeeding  Perineal Discomfort - Denies  Incisional Discomfort- Denies  Depression - Denies s/s depression.  See PP depression screen.  Emotional Support - Yes  Bowel Symptoms - Negative for abdominal discomfort, blood in stool or black stool, and negative change in bowel habits. She does report still taking a stool softener in conjunction with her iron supplement to prevent constipation.   Bladder Symptoms - No dysuria, denies hematuria, urinary frequency, urinary urgency or incontinence.   Lochia resolved.  Menses Since Delivery - Not resumed  Menstrual Pattern Prior to Pregnancy - Irregular d/t PCOS  Menands since Delivery - No    Contraception Plan - POP    Pap Hx: last PAP 21 ASCUS, HPV Neg    Objective      Visit Vitals  OB Status Recent pregnancy   Smoking Status Never        Physical Exam  Vitals reviewed.   Constitutional:       General: She is not in acute distress.     Appearance: Normal appearance.   HENT:      Head: Normocephalic and atraumatic.   Pulmonary:      Effort: Pulmonary effort is normal.   Abdominal:      General: Abdomen is flat.      Palpations: Abdomen is soft.      Tenderness: There is no abdominal tenderness.      Comments: no diastasis recti; low transverse  scar appears fully healed, well-approximated, without edema, erythema, or drainage   Genitourinary:     General: Normal vulva.      Exam position: Lithotomy position.      Pubic Area: No rash.       Labia:         Right: No rash or lesion.         Left: No rash or lesion.       Urethra: No prolapse, urethral swelling or urethral lesion.      Vagina: Normal.      Cervix: No cervical motion  tenderness, discharge or lesion.      Uterus: Normal. Not enlarged and not tender.       Comments: Uterine involution complete  Musculoskeletal:         General: Normal range of motion.      Cervical back: Normal range of motion.   Skin:     General: Skin is warm and dry.   Neurological:      Mental Status: She is alert and oriented to person, place, and time.   Psychiatric:         Mood and Affect: Mood normal.         Behavior: Behavior normal.         Thought Content: Thought content normal.         Judgment: Judgment normal.

## 2024-06-20 ENCOUNTER — APPOINTMENT (OUTPATIENT)
Dept: PHYSICAL THERAPY | Facility: CLINIC | Age: 34
End: 2024-06-20
Payer: COMMERCIAL

## 2024-06-26 ENCOUNTER — LAB (OUTPATIENT)
Dept: LAB | Facility: LAB | Age: 34
End: 2024-06-26
Payer: COMMERCIAL

## 2024-06-26 DIAGNOSIS — Z86.32 HISTORY OF GESTATIONAL DIABETES: ICD-10-CM

## 2024-06-26 LAB
CYTOLOGY CMNT CVX/VAG CYTO-IMP: NORMAL
GLUCOSE 2H P 75 G GLC PO SERPL-MCNC: 115 MG/DL
GLUCOSE P FAST SERPL-MCNC: 81 MG/DL
HPV HR 12 DNA GENITAL QL NAA+PROBE: NEGATIVE
HPV HR GENOTYPES PNL CVX NAA+PROBE: NEGATIVE
HPV16 DNA SPEC QL NAA+PROBE: NEGATIVE
HPV18 DNA SPEC QL NAA+PROBE: NEGATIVE
LAB AP HPV GENOTYPE QUESTION: YES
LAB AP HPV HR: NORMAL
LABORATORY COMMENT REPORT: NORMAL
PATH REPORT.TOTAL CANCER: NORMAL

## 2024-06-26 PROCEDURE — 82950 GLUCOSE TEST: CPT

## 2024-06-26 PROCEDURE — 82947 ASSAY GLUCOSE BLOOD QUANT: CPT

## 2024-06-26 PROCEDURE — 36415 COLL VENOUS BLD VENIPUNCTURE: CPT

## 2024-06-27 ENCOUNTER — APPOINTMENT (OUTPATIENT)
Dept: PHYSICAL THERAPY | Facility: CLINIC | Age: 34
End: 2024-06-27
Payer: COMMERCIAL

## 2024-06-27 ENCOUNTER — TELEPHONE (OUTPATIENT)
Dept: OBSTETRICS AND GYNECOLOGY | Facility: CLINIC | Age: 34
End: 2024-06-27
Payer: COMMERCIAL

## 2024-06-27 NOTE — TELEPHONE ENCOUNTER
Pt returned call to office.  Pt informed to have pap repeated in 1-2 mos per advice of CNM.  Pt transferred to  to schedule.

## 2024-06-27 NOTE — TELEPHONE ENCOUNTER
----- Message from SOLE Mendenhall sent at 6/27/2024  7:57 AM EDT -----  Please call Marisabel and let her know that her PAP smear was unable to be interpreted due to inflammation. This is not uncommon in the postpartum period. Please have her schedule a visit to repeat her PAP smear in 1-2 months. Thank you!

## 2024-06-27 NOTE — TELEPHONE ENCOUNTER
Attempted to call pt to provide her with pap result and recommendation from CNM  Got her voice mail.     Message left to call office.

## 2024-07-08 ENCOUNTER — APPOINTMENT (OUTPATIENT)
Dept: DERMATOLOGY | Facility: CLINIC | Age: 34
End: 2024-07-08
Payer: COMMERCIAL

## 2024-07-08 DIAGNOSIS — L85.8 KERATOSIS PILARIS: ICD-10-CM

## 2024-07-08 DIAGNOSIS — L91.8 SKIN TAG: ICD-10-CM

## 2024-07-08 DIAGNOSIS — L21.9 SEBORRHEIC DERMATITIS: Primary | ICD-10-CM

## 2024-07-08 DIAGNOSIS — L60.8 NAIL DISCOLORATION: ICD-10-CM

## 2024-07-08 DIAGNOSIS — D23.9 DERMATOFIBROMA: ICD-10-CM

## 2024-07-08 PROCEDURE — 99204 OFFICE O/P NEW MOD 45 MIN: CPT | Performed by: DERMATOLOGY

## 2024-07-08 RX ORDER — KETOCONAZOLE 20 MG/ML
SHAMPOO, SUSPENSION TOPICAL
Qty: 120 ML | Refills: 11 | Status: SHIPPED | OUTPATIENT
Start: 2024-07-08

## 2024-07-08 RX ORDER — FLUOCINONIDE TOPICAL SOLUTION USP, 0.05% 0.5 MG/ML
SOLUTION TOPICAL
Qty: 60 ML | Refills: 3 | Status: SHIPPED | OUTPATIENT
Start: 2024-07-08

## 2024-07-08 ASSESSMENT — DERMATOLOGY PATIENT ASSESSMENT
ARE YOU AN ORGAN TRANSPLANT RECIPIENT: NO
DO YOU USE A TANNING BED: YES, PREVIOUSLY
HAVE YOU HAD OR DO YOU HAVE A STAPH INFECTION: NO
ARE YOU ON BIRTH CONTROL: NO
DO YOU USE SUNSCREEN: DAILY
ARE YOU TRYING TO GET PREGNANT: NO
HAVE YOU HAD OR DO YOU HAVE VASCULAR DISEASE: NO
DO YOU HAVE ANY NEW OR CHANGING LESIONS: NO
DO YOU HAVE IRREGULAR MENSTRUAL CYCLES: YES

## 2024-07-08 ASSESSMENT — DERMATOLOGY QUALITY OF LIFE (QOL) ASSESSMENT
RATE HOW BOTHERED YOU ARE BY SYMPTOMS OF YOUR SKIN PROBLEM (EG, ITCHING, STINGING BURNING, HURTING OR SKIN IRRITATION): 3
DATE THE QUALITY-OF-LIFE ASSESSMENT WAS COMPLETED: 67029
RATE HOW BOTHERED YOU ARE BY EFFECTS OF YOUR SKIN PROBLEMS ON YOUR ACTIVITIES (EG, GOING OUT, ACCOMPLISHING WHAT YOU WANT, WORK ACTIVITIES OR YOUR RELATIONSHIPS WITH OTHERS): 1
RATE HOW EMOTIONALLY BOTHERED YOU ARE BY YOUR SKIN PROBLEM (FOR EXAMPLE, WORRY, EMBARRASSMENT, FRUSTRATION): 3

## 2024-07-08 ASSESSMENT — PATIENT GLOBAL ASSESSMENT (PGA): PATIENT GLOBAL ASSESSMENT: PATIENT GLOBAL ASSESSMENT:  3 - MODERATE

## 2024-07-08 ASSESSMENT — ITCH NUMERIC RATING SCALE
HOW SEVERE IS YOUR ITCHING?: 4
HOW SEVERE IS YOUR ITCHING?: 5

## 2024-07-08 NOTE — PROGRESS NOTES
Subjective     Marisabel Segundo is a 34 y.o. female who presents for the following: Nurse Visit (New to Dr. Brasher. Reports a dark line on 2 of her fingernails. One on right thumb, and the other on left index finger. Discussed removal of skin tags on neck chest and armpits.  Pt aware of cosmetic procedure likelihood. Right scapular dark hard raised  bumps, and on right knee cap.  She is 9 weeks post partum. Complaints of moderate itch of scalp. H/o seborrheic dermatitis.).     Dermatofibroma -- right knee, grouped on right upper upper back  Seborrhea- ketoconazole     Skin Cancer History  No skin cancer on file.    Review of Systems:  No other skin or systemic complaints other than what is documented elsewhere in the note.    The following portions of the chart were reviewed this encounter and updated as appropriate:       Specialty Problems    None    Past Medical History:  Marisabel Segundo  has a past medical history of Chronic viral hepatitis B without delta-agent (Multi) (06/23/2022) and Polycystic ovarian syndrome.    Past Surgical History:  Marisabel Segundo  has a past surgical history that includes Other surgical history (08/31/2021).    Family History:  Patient family history includes Hyperlipidemia in her father and mother; Hypertension in her father and mother; brain tia in her maternal grandmother.    Social History:  Marisabel Segundo  reports that she has never smoked. She has never used smokeless tobacco. She reports that she does not currently use alcohol. She reports that she does not use drugs.    Allergies:  Patient has no known allergies.    Current Medications / CAM's:    Current Outpatient Medications:     cholecalciferol (Vitamin D-3) 5,000 Units tablet, Take 1 tablet (5,000 Units) by mouth once daily., Disp: , Rfl:     levothyroxine (Synthroid) 50 mcg tablet, Take 1 tablet (50 mcg) by mouth once daily in the morning. Take before meals., Disp: 30 tablet, Rfl: 11    prenatal no115/iron/folic acid (PRENATAL 19 ORAL), Take 1  tablet by mouth once daily., Disp: , Rfl:     fluocinonide (Lidex) 0.05 % external solution, Apply to itchy areas on scalp nightly for 1 week, then decrease to 3 nights per week as needed, Disp: 60 mL, Rfl: 3    ketoconazole (NIZOral) 2 % shampoo, Lather to scalp, let sit for 10 minutes before rinsing, Disp: 120 mL, Rfl: 11    norethindrone (Micronor) 0.35 mg tablet, Take 1 tablet (0.35 mg) over 28 days by mouth once daily. (Patient not taking: Reported on 7/8/2024), Disp: 28 tablet, Rfl: 11     Objective   Well appearing patient in no apparent distress; mood and affect are within normal limits.    A focused exam of areas below was performed.    Scalp  Diffuse greasy scale on scalp with patchy background erythema - greatest on occipital and frontal scalp    Left Hand - Posterior, Right Hand - Posterior  Linear light brown/tan even pigment bands on right thumb and second index fingernails; no periungual papules or ulcerations. No pigmentation of hyponychium.               Chest - Medial (Center), Left Axilla, Left Inframammary Fold, Neck - Anterior, Right Axilla, Right Inframammary Fold  Skin colored to hyperpigmented pedunculated and few sessile papules- > 15    Left Lower Leg - Anterior, Left Upper Arm - Posterior, Right Lower Leg - Anterior, Right Upper Arm - Posterior  Folliculocentric keratotic papules extensor arms and follicular pink macules on lower legs    Right Knee - Anterior, Right Upper Back (5)  Dermal slightly hyperpigmented papulonodules that dimple when squeezed on right knee and grouped on right upper back          Assessment/Plan   Seborrheic dermatitis  Scalp    Seborrhea- nature reviewed, flaring  - recommend either upping her head and shoulders to 10-20 minutes twice weekly or switching to Rx keto shampoo, she chooses the later  - START ketoconazole 2 % shampoo, lather to scalp and rinse after 10+ minutes. Use at least twice weekly  - START fluocinonide soln nightly x 1 week, then decrease to  "application to itchy areas on scalp 3 nights per week PRN only.  - Risks of topical corticosteriods reviewed including skin thinning, easy bruising, discoloration with prolonged use.      Fuv 3-4 mos     ketoconazole (NIZOral) 2 % shampoo - Scalp  Lather to scalp, let sit for 10 minutes before rinsing    fluocinonide (Lidex) 0.05 % external solution - Scalp  Apply to itchy areas on scalp nightly for 1 week, then decrease to 3 nights per week as needed    Related Procedures  Follow Up In Dermatology - Established Patient    Nail discoloration (2)  Left Hand - Posterior; Right Hand - Posterior    Favor physiologic nail pigmenetation, darkening with recent pregnancy.  - no signs of malignancy. Reassurance provided. Images today. Reviewed features to watch for- widening and/or darkening bands, papules/sores around nail fold, other changes.     Skin tag (6)  Neck - Anterior; Chest - Medial (Center); Left Inframammary Fold; Right Inframammary Fold; Left Axilla; Right Axilla    Skin tags-  Benign nature reviewed, reviewed cosmetic removal with lidocaine and snip removal. Reviewed cosmetic costs and can treat maximum 15 in one visit. She opts to Return for 15 min cosmetic with resident for 15 tags; aware will need second visit if would like additional done    Related Procedures  Follow Up In Dermatology - Established Patient    Keratosis pilaris (4)  Left Upper Arm - Posterior; Right Upper Arm - Posterior; Left Lower Leg - Anterior; Right Lower Leg - Anterior    Keratosis pilaris and \"strawberry\" legs  - benign physiologic nature reivewed- skin type rather than disease. Chronic nature reviewed.   - START KP amlactin lotion or amlactin daily lotion, apply to clean dry skin daily     Dermatofibroma (6)  Right Knee - Anterior; Right Upper Back (5)    Favor dermatofibromas, perhaps a few intermixxed acne scars on back.   - Discussed benign nature and that no treatment is necessary unless it becomes painful or increases in size. " Patient opts for clinical monitoring at this time.           Fuv 3-4 m os for seborrhea fuv  First available 15 min cosmetic skin tags per patient preference    Reyna Brasher MD

## 2024-07-09 ENCOUNTER — APPOINTMENT (OUTPATIENT)
Dept: PHYSICAL THERAPY | Facility: CLINIC | Age: 34
End: 2024-07-09
Payer: COMMERCIAL

## 2024-07-30 ENCOUNTER — APPOINTMENT (OUTPATIENT)
Dept: DERMATOLOGY | Facility: CLINIC | Age: 34
End: 2024-07-30
Payer: COMMERCIAL

## 2024-08-20 ENCOUNTER — APPOINTMENT (OUTPATIENT)
Dept: OBSTETRICS AND GYNECOLOGY | Facility: CLINIC | Age: 34
End: 2024-08-20
Payer: COMMERCIAL

## 2024-08-20 VITALS
SYSTOLIC BLOOD PRESSURE: 112 MMHG | BODY MASS INDEX: 24.27 KG/M2 | WEIGHT: 151 LBS | HEIGHT: 66 IN | DIASTOLIC BLOOD PRESSURE: 70 MMHG

## 2024-08-20 DIAGNOSIS — Z12.4 CERVICAL CANCER SCREENING: Primary | ICD-10-CM

## 2024-08-20 PROCEDURE — 3008F BODY MASS INDEX DOCD: CPT

## 2024-08-20 PROCEDURE — 1036F TOBACCO NON-USER: CPT

## 2024-08-20 PROCEDURE — 88175 CYTOPATH C/V AUTO FLUID REDO: CPT

## 2024-08-20 PROCEDURE — 87624 HPV HI-RISK TYP POOLED RSLT: CPT

## 2024-08-20 PROCEDURE — 99213 OFFICE O/P EST LOW 20 MIN: CPT

## 2024-08-20 ASSESSMENT — PAIN SCALES - GENERAL: PAINLEVEL: 0-NO PAIN

## 2024-08-20 NOTE — PROGRESS NOTES
"Repeat pap. Last pap on 6.14.24 showed no interpretation    Chaperone declined: Wendi Frye, CM3    Assessment/Plan     34 y.o. female here for -     Cervical Cancer Screening  - PAP collected today  - Reviewed ASCCP guidelines with patient; if nml and HPV neg, next PAP will be due in 5 years     RTO yearly for annual exam, or sooner as needed.    Armida May, APRN-CNM     Henok Segundo is a 34 y.o. female presenting for PAP smear only.  PAP smear collected on 6/14/24 had obscuring inflammation, needs repeated.   She is doing well and has no concerns.     Objective     /70   Ht 1.676 m (5' 6\")   Wt 68.5 kg (151 lb)   Breastfeeding Yes   BMI 24.37 kg/m²     Physical Exam  Vitals reviewed.   Constitutional:       General: She is not in acute distress.     Appearance: Normal appearance.   HENT:      Head: Normocephalic and atraumatic.   Pulmonary:      Effort: Pulmonary effort is normal.   Genitourinary:     General: Normal vulva.      Exam position: Lithotomy position.      Pubic Area: No rash.       Labia:         Right: No rash or lesion.         Left: No rash or lesion.       Urethra: No prolapse, urethral pain or urethral swelling.      Vagina: Normal.      Cervix: No cervical motion tenderness, discharge or lesion.   Musculoskeletal:         General: Normal range of motion.      Cervical back: Normal range of motion.   Skin:     General: Skin is warm and dry.   Neurological:      Mental Status: She is alert and oriented to person, place, and time.   Psychiatric:         Mood and Affect: Mood normal.         Behavior: Behavior normal.         Thought Content: Thought content normal.         Judgment: Judgment normal.          "

## 2024-09-03 LAB
CYTOLOGY CMNT CVX/VAG CYTO-IMP: NORMAL
HPV HR 12 DNA GENITAL QL NAA+PROBE: NEGATIVE
HPV HR GENOTYPES PNL CVX NAA+PROBE: NEGATIVE
HPV16 DNA SPEC QL NAA+PROBE: NEGATIVE
HPV18 DNA SPEC QL NAA+PROBE: NEGATIVE
LAB AP HPV GENOTYPE QUESTION: YES
LAB AP HPV HR: NORMAL
LABORATORY COMMENT REPORT: NORMAL
PATH REPORT.TOTAL CANCER: NORMAL

## 2024-09-24 ENCOUNTER — APPOINTMENT (OUTPATIENT)
Dept: OPHTHALMOLOGY | Facility: CLINIC | Age: 34
End: 2024-09-24
Payer: COMMERCIAL

## 2024-10-29 DIAGNOSIS — O99.281 HYPOTHYROIDISM AFFECTING PREGNANCY IN FIRST TRIMESTER (HHS-HCC): ICD-10-CM

## 2024-10-29 DIAGNOSIS — E03.9 HYPOTHYROIDISM AFFECTING PREGNANCY IN FIRST TRIMESTER (HHS-HCC): ICD-10-CM

## 2024-10-29 RX ORDER — LEVOTHYROXINE SODIUM 50 UG/1
50 TABLET ORAL
Qty: 30 TABLET | Refills: 11 | Status: SHIPPED | OUTPATIENT
Start: 2024-10-29 | End: 2025-10-29

## 2024-10-30 ENCOUNTER — APPOINTMENT (OUTPATIENT)
Dept: DERMATOLOGY | Facility: CLINIC | Age: 34
End: 2024-10-30
Payer: COMMERCIAL

## 2024-12-13 ENCOUNTER — APPOINTMENT (OUTPATIENT)
Dept: PRIMARY CARE | Facility: CLINIC | Age: 34
End: 2024-12-13
Payer: COMMERCIAL

## 2024-12-13 VITALS
WEIGHT: 145 LBS | RESPIRATION RATE: 14 BRPM | TEMPERATURE: 97.9 F | DIASTOLIC BLOOD PRESSURE: 70 MMHG | HEART RATE: 61 BPM | HEIGHT: 66 IN | OXYGEN SATURATION: 98 % | BODY MASS INDEX: 23.3 KG/M2 | SYSTOLIC BLOOD PRESSURE: 100 MMHG

## 2024-12-13 DIAGNOSIS — L30.9 DERMATITIS: ICD-10-CM

## 2024-12-13 DIAGNOSIS — M25.511 CHRONIC RIGHT SHOULDER PAIN: ICD-10-CM

## 2024-12-13 DIAGNOSIS — O24.410 DIET CONTROLLED GESTATIONAL DIABETES MELLITUS (GDM), ANTEPARTUM (HHS-HCC): ICD-10-CM

## 2024-12-13 DIAGNOSIS — E78.1 HYPERTRIGLYCERIDEMIA: ICD-10-CM

## 2024-12-13 DIAGNOSIS — G89.29 CHRONIC RIGHT SHOULDER PAIN: ICD-10-CM

## 2024-12-13 DIAGNOSIS — R79.89 LOW VITAMIN D LEVEL: ICD-10-CM

## 2024-12-13 DIAGNOSIS — E03.8 SUBCLINICAL HYPOTHYROIDISM: Primary | ICD-10-CM

## 2024-12-13 PROCEDURE — 3078F DIAST BP <80 MM HG: CPT | Performed by: INTERNAL MEDICINE

## 2024-12-13 PROCEDURE — 99214 OFFICE O/P EST MOD 30 MIN: CPT | Performed by: INTERNAL MEDICINE

## 2024-12-13 PROCEDURE — 3074F SYST BP LT 130 MM HG: CPT | Performed by: INTERNAL MEDICINE

## 2024-12-13 PROCEDURE — 1036F TOBACCO NON-USER: CPT | Performed by: INTERNAL MEDICINE

## 2024-12-13 PROCEDURE — 3008F BODY MASS INDEX DOCD: CPT | Performed by: INTERNAL MEDICINE

## 2024-12-13 PROCEDURE — 3061F NEG MICROALBUMINURIA REV: CPT | Performed by: INTERNAL MEDICINE

## 2024-12-13 PROCEDURE — 3048F LDL-C <100 MG/DL: CPT | Performed by: INTERNAL MEDICINE

## 2024-12-13 RX ORDER — BETAMETHASONE DIPROPIONATE 0.5 MG/G
OINTMENT TOPICAL 2 TIMES DAILY PRN
Qty: 15 G | Refills: 0 | Status: SHIPPED | OUTPATIENT
Start: 2024-12-13 | End: 2025-04-12

## 2024-12-13 ASSESSMENT — ENCOUNTER SYMPTOMS
NAUSEA: 0
DIARRHEA: 0
WHEEZING: 0
CONSTIPATION: 0
ABDOMINAL PAIN: 0
PALPITATIONS: 0
COUGH: 0
SHORTNESS OF BREATH: 0

## 2024-12-13 NOTE — PROGRESS NOTES
"Subjective   Patient ID: Marisabel Segundo is a 34 y.o. female who presents for questions about gestational DM and Hypothyroidism.    Several concerns.    One is shoulder (points to trapezius muscle on right).  Off and on pain since having shoulder surgery years ago.  She is a little worried about her triglycerides and history of DM.  We reviewed and discussed her previous labs.      Review of Systems   Respiratory:  Negative for cough, shortness of breath and wheezing.    Cardiovascular:  Negative for chest pain and palpitations.   Gastrointestinal:  Negative for abdominal pain, constipation, diarrhea and nausea.     Objective   /70 (BP Location: Left arm, Patient Position: Sitting, BP Cuff Size: Adult)   Pulse 61   Temp 36.6 °C (97.9 °F) (Tympanic)   Resp 14   Ht 1.676 m (5' 6\")   Wt 65.8 kg (145 lb)   SpO2 98%   Breastfeeding Yes   BMI 23.40 kg/m²     Physical Exam  Vitals reviewed.   Constitutional:       Appearance: Normal appearance.   HENT:      Head: Normocephalic.   Cardiovascular:      Rate and Rhythm: Normal rate.   Pulmonary:      Effort: Pulmonary effort is normal.   Musculoskeletal:         General: Normal range of motion.   Neurological:      General: No focal deficit present.      Mental Status: She is alert.   Psychiatric:         Mood and Affect: Mood normal.         Assessment/Plan   Problem List Items Addressed This Visit             ICD-10-CM    Subclinical hypothyroidism - Primary E03.8    Relevant Orders    Thyroid Stimulating Hormone     Other Visit Diagnoses         Codes    Hypertriglyceridemia     E78.1    Relevant Orders    Lipid Panel    Comprehensive Metabolic Panel    Chronic right shoulder pain     M25.511, G89.29    Relevant Orders    Referral to Physical Therapy    Diet controlled gestational diabetes mellitus (GDM), antepartum ([facility])     O24.410    Low vitamin D level     R79.89    Relevant Orders    Vitamin D 25-Hydroxy,Total (for eval of Vitamin D levels)    Dermatitis   "   L30.9    Relevant Medications    betamethasone dipropionate (Diprosone) 0.05 % ointment        Discussed all of the above.    Stressed need for low sugar, low carb diet with regular exercise.    For muscle tension we discussed heat, massage, stretching and Voltaren gel.    We will monitor her TSH as well as her BS and TG.    Steroid ointment for dyshydrosis derm   Follow up in 6 months or prn.

## 2024-12-14 ENCOUNTER — LAB (OUTPATIENT)
Dept: LAB | Facility: LAB | Age: 34
End: 2024-12-14
Payer: COMMERCIAL

## 2024-12-14 DIAGNOSIS — E78.1 HYPERTRIGLYCERIDEMIA: ICD-10-CM

## 2024-12-14 DIAGNOSIS — E03.9 HYPOTHYROIDISM AFFECTING PREGNANCY IN FIRST TRIMESTER (HHS-HCC): ICD-10-CM

## 2024-12-14 DIAGNOSIS — O99.281 HYPOTHYROIDISM AFFECTING PREGNANCY IN FIRST TRIMESTER (HHS-HCC): ICD-10-CM

## 2024-12-14 DIAGNOSIS — E03.8 SUBCLINICAL HYPOTHYROIDISM: ICD-10-CM

## 2024-12-14 DIAGNOSIS — R79.89 LOW VITAMIN D LEVEL: ICD-10-CM

## 2024-12-14 LAB
25(OH)D3 SERPL-MCNC: 62 NG/ML (ref 30–100)
ALBUMIN SERPL BCP-MCNC: 4.6 G/DL (ref 3.4–5)
ALP SERPL-CCNC: 105 U/L (ref 33–110)
ALT SERPL W P-5'-P-CCNC: 16 U/L (ref 7–45)
ANION GAP SERPL CALC-SCNC: 14 MMOL/L (ref 10–20)
AST SERPL W P-5'-P-CCNC: 17 U/L (ref 9–39)
BILIRUB SERPL-MCNC: 0.6 MG/DL (ref 0–1.2)
BUN SERPL-MCNC: 13 MG/DL (ref 6–23)
CALCIUM SERPL-MCNC: 10.1 MG/DL (ref 8.6–10.6)
CHLORIDE SERPL-SCNC: 100 MMOL/L (ref 98–107)
CHOLEST SERPL-MCNC: 184 MG/DL (ref 0–199)
CHOLESTEROL/HDL RATIO: 4
CO2 SERPL-SCNC: 27 MMOL/L (ref 21–32)
CREAT SERPL-MCNC: 0.69 MG/DL (ref 0.5–1.05)
EGFRCR SERPLBLD CKD-EPI 2021: >90 ML/MIN/1.73M*2
GLUCOSE SERPL-MCNC: 84 MG/DL (ref 74–99)
HDLC SERPL-MCNC: 45.8 MG/DL
LDLC SERPL CALC-MCNC: 114 MG/DL
NON HDL CHOLESTEROL: 138 MG/DL (ref 0–149)
POTASSIUM SERPL-SCNC: 4.1 MMOL/L (ref 3.5–5.3)
PROT SERPL-MCNC: 7.7 G/DL (ref 6.4–8.2)
SODIUM SERPL-SCNC: 137 MMOL/L (ref 136–145)
TRIGL SERPL-MCNC: 119 MG/DL (ref 0–149)
TSH SERPL-ACNC: 2.16 MIU/L (ref 0.44–3.98)
VLDL: 24 MG/DL (ref 0–40)

## 2024-12-14 PROCEDURE — 82306 VITAMIN D 25 HYDROXY: CPT

## 2024-12-14 PROCEDURE — 36415 COLL VENOUS BLD VENIPUNCTURE: CPT

## 2024-12-14 PROCEDURE — 84443 ASSAY THYROID STIM HORMONE: CPT

## 2024-12-14 PROCEDURE — 80053 COMPREHEN METABOLIC PANEL: CPT

## 2024-12-14 PROCEDURE — 80061 LIPID PANEL: CPT

## 2025-01-02 ENCOUNTER — EVALUATION (OUTPATIENT)
Dept: PHYSICAL THERAPY | Facility: CLINIC | Age: 35
End: 2025-01-02
Payer: COMMERCIAL

## 2025-01-02 DIAGNOSIS — M25.511 CHRONIC RIGHT SHOULDER PAIN: ICD-10-CM

## 2025-01-02 DIAGNOSIS — G89.29 CHRONIC RIGHT SHOULDER PAIN: ICD-10-CM

## 2025-01-02 PROCEDURE — 97161 PT EVAL LOW COMPLEX 20 MIN: CPT | Mod: GP | Performed by: PHYSICAL THERAPIST

## 2025-01-02 PROCEDURE — 97110 THERAPEUTIC EXERCISES: CPT | Mod: GP | Performed by: PHYSICAL THERAPIST

## 2025-01-02 ASSESSMENT — ENCOUNTER SYMPTOMS
DEPRESSION: 0
OCCASIONAL FEELINGS OF UNSTEADINESS: 0
LOSS OF SENSATION IN FEET: 0

## 2025-01-02 NOTE — PROGRESS NOTES
Initial evaluation  Physical Therapy Initial Evaluation    Patient Name:Marisabel Segundo  MRN:[id number]  Today's Date:1/2/2025  Referred by: Renaldo Weller  Time Calculation  Start Time: 0850  Stop Time: 0930  Time Calculation (min): 40 min    Therapy Diagnosis  1. Chronic right shoulder pain         Plan of Care  Planned interventions include PRN: therapeutic exercise, manual therapy, dry needling, electrical stimulation, hot pack, vasopneumatic device with cold, HEP training.   Frequency and duration: 1 time(s) a week, for  8-10 weeks or 10 visits .   Plan of care was developed with input and agreement by the patient.   Plan for next session: Manual Therapy, postural re-education and mobility    Assessment  Problem List: Pain, range of motion/joint mobility, strength, activity limitations, ADLs/IADLs/self care skills, decreased functional level, decreased knowledge of HEP, flexibility, and posture.     Patient is a 35 y.o. female who presents with complaint of chronic R shoulder pain . Standardized testing and measures administered today reveal that the patient has multiple impairments in body structures and functions, activity limitations, and participation restrictions. These include subjective and objective findings such as pain, tenderness to palpation of the affected area, decreased ROM, strength, flexibility, and function. The patient's impairments are likely influenced by mechanical dysfunction and deconditioning with possible overuse and degenerative changes. Skilled PT services are warranted in order to realize measurable and meaningful change in the above outcome measures and achieve improvements in the patient's functional status and individual goals.    Rehab Potential:good  Clinical Presentation: Stable and/or uncomplicated characteristics.   Evaluation Complexity: Low      Precautions/Fall Risk:none* Pacemaker no  Seizures No  Post Op Movement/Restrictions No    Insurance  Visit number: 1   Approved  number of visits: 60  Onset Date: 2024  Certification Period:  Beginnin/2/2025            Ending: ?  Payor: SINDY / Plan: SINDY [facility] / Product Type: *No Product type* /     Subjective  Chief complaint/reason for visit: Patient is a 35 yr old female with history of R labral repair in . 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 She feels sitting and sleeping are most problematic. Complains of tension headaches at times as well.   Mechanism of Injury:  a chronic condition and labral tear  surgery   Location of Pain: R shoulder/UT/Neck/mid back   Current Pain Level (0-10): 5  High Pain Level (0-10): 8   Low Pain Level (0-10): 2  Pain Quality: aching, tightness, and    Pain Exacerbating Factors: lying down, sitting, looking down, daily RHD tasks  Pain Relieving Factors: medications Tylenol and Voltaren Gel, Theragun, position change, and      Medical Screening: Reviewed medical history form with patient and medical screening assessed.   Red Flags: Do you have any of the following? No  Fever/chills, unexplained weight changes, dizziness/fainting, unexplained change in bowel or bladder functions, unexplained malaise or muscle weakness, night pain/sweats, numbness or tingling  Current Medical Management: Previous Chiropractic   Prior Level of Function (PLOF)  Patient previously independent with all ADLs  Exercise/Physical Activity: Yoga  Functional limitations: decreased positional tolerances to bending, driving, reaching, self-care activities, work related tasks, push/pull activities, participation in home management/duties, participation in leisure activities and athletics.  Work Status: full time job doing desk job  Current Status: worsened   Patient Awareness: Patient is aware of  her diagnosis and prognosis.   Living Environment: house  Social Support:  Family  Personal Factors That  May Impact Care: none  Patient's Goal for Treatment: relieving pain, increasing strength, increasing mobility, improving positional tolerances, reducing symptoms, returning to symptom free work, and resuming athletics/activities .     Objective  Other Measures  Disability of Arm Shoulder Hand (DASH): 9.1%     Upper Extremity Objective:  Observation/Posture: mild rounded shoulder position  Palpation:  AROM (tested in supine): L side WNL  R/L shoulder flexion AROM (degrees): WNL  R/L shoulder extension AROM (degrees):  R/L shoulder abduction AROM (degrees): WNL  R/L shoulder ER AROM (degrees): 70 deg with stiffness  R/L shoulder IR AROM (degrees): WFL with stiffness  R/L elbow AROM (degrees): WNL   MMT:   R/L shoulder flexion strength (MMT): 4+  R/L shoulder abduction strength (MMT): 4+  R/L shoulder ER strength (MMT): 4/5  R/L shoulder IR strength (MMT): 4/5  R/L elbow flex/ext (MMT): 5/5    Special Tests: unremarkable    Treatment Performed Today: Initial evaluation and patient education regarding diagnosis, prognosis, contributing factors, comorbidities, importance and instruction of HEP, role of PT, postural re-education, activity modification, and body mechanics.    Therapeutic Exercise 10 minutes  Education/Resources provided today: Home Program   [facility]: Provided, reviewed, and performed the following therapeutic exercises with the patient:  Access Code: [email or web address]  URL: [email or web address]  Date: 01/02/2025  Prepared by: Noreen Aaron    Exercises  - Seated Scapular Retraction  - 1 x daily - 7 x weekly - 2 sets - 10 reps - 5 hold  - Supine Cervical Retraction with Towel  - 1 x daily - 7 x weekly - 2 sets - 10 reps - 5 hold  - Doorway Pec Stretch at 90 Degrees Abduction  - 1 x daily - 7 x weekly - 3 sets - 30 hold  - Sidelying Thoracic Rotation with Open Book  - 1 x daily - 7 x weekly - 10 reps - 10 hold  - Seated Upper Trapezius Stretch  - 1 x daily - 7 x weekly - 3 sets - 30 hold  -  Seated Levator Scapulae Stretch  - 1 x daily - 7 x weekly - 3 sets - 30 hold    Modalities   Vasopneumatic Device       minutes  Electrical Stimulation          minutes    Response to Treatment: improved knowledge and understanding of condition, decreased pain, improved joint mobility/ROM, improved strength, improved flexibility, improved tissue mobility, improved posture.    Goals: Goals set and discussed today.   Upper Extremity Goals  At time of discharge, the patient will:  1) Perform HEP independently within the clinic to show adherence to initial program for symptom management.  2) Patient will report a reduction in pain at its worst from 8/10 to 2/10 to allow the patient to perform essential ADLs and iADLs at [facility].  3) Demonstrate proper scapular position and posture for improved positional tolerances to sitting and standing > 60 minutes.   4) Decrease score of Quick Dash by > 8 points to meet MCID, demonstrating a clinically significant improvement in function.  5) Patient will tolerate laying on affected shoulder with unrestricted sleep for 6-8 hours.  6) Patient will demonstrate functional strength of the affected shoulder to perform lift of 8-12 pounds for groceries and laundry duties.   7) Patient will demonstrate pain free strength of 5/5 for all affected shoulder groups for return to unrestricted lifting activities.  8) Be able to perform the ADL of yoga without an increase or production of symptoms  Patient stated goal: lessen pain and strengthen shoulder

## 2025-01-06 ENCOUNTER — APPOINTMENT (OUTPATIENT)
Dept: PHYSICAL THERAPY | Facility: CLINIC | Age: 35
End: 2025-01-06
Payer: COMMERCIAL

## 2025-01-08 ENCOUNTER — APPOINTMENT (OUTPATIENT)
Dept: PRIMARY CARE | Facility: CLINIC | Age: 35
End: 2025-01-08
Payer: COMMERCIAL

## 2025-01-16 ENCOUNTER — APPOINTMENT (OUTPATIENT)
Dept: PHYSICAL THERAPY | Facility: CLINIC | Age: 35
End: 2025-01-16
Payer: COMMERCIAL

## 2025-01-22 ENCOUNTER — APPOINTMENT (OUTPATIENT)
Dept: PHYSICAL THERAPY | Facility: CLINIC | Age: 35
End: 2025-01-22
Payer: COMMERCIAL

## 2025-05-14 ENCOUNTER — APPOINTMENT (OUTPATIENT)
Dept: PRIMARY CARE | Facility: CLINIC | Age: 35
End: 2025-05-14
Payer: COMMERCIAL

## 2025-05-14 VITALS
WEIGHT: 141 LBS | HEART RATE: 75 BPM | SYSTOLIC BLOOD PRESSURE: 110 MMHG | OXYGEN SATURATION: 99 % | HEIGHT: 66 IN | DIASTOLIC BLOOD PRESSURE: 60 MMHG | RESPIRATION RATE: 14 BRPM | TEMPERATURE: 97.5 F | BODY MASS INDEX: 22.66 KG/M2

## 2025-05-14 DIAGNOSIS — Z13.220 SCREENING FOR HYPERLIPIDEMIA: ICD-10-CM

## 2025-05-14 DIAGNOSIS — Z00.00 PERIODIC HEALTH ASSESSMENT, GENERAL SCREENING, ADULT: ICD-10-CM

## 2025-05-14 DIAGNOSIS — M79.10 MUSCLE TENSION PAIN: ICD-10-CM

## 2025-05-14 DIAGNOSIS — M77.9 TENDONITIS: Primary | ICD-10-CM

## 2025-05-14 DIAGNOSIS — L85.3 DRY SKIN DERMATITIS: ICD-10-CM

## 2025-05-14 PROCEDURE — 99213 OFFICE O/P EST LOW 20 MIN: CPT | Performed by: INTERNAL MEDICINE

## 2025-05-14 PROCEDURE — 3008F BODY MASS INDEX DOCD: CPT | Performed by: INTERNAL MEDICINE

## 2025-05-14 PROCEDURE — 1036F TOBACCO NON-USER: CPT | Performed by: INTERNAL MEDICINE

## 2025-05-14 RX ORDER — PREDNISONE 10 MG/1
10 TABLET ORAL SEE ADMIN INSTRUCTIONS
Qty: 30 TABLET | Refills: 0 | Status: SHIPPED | OUTPATIENT
Start: 2025-05-14 | End: 2025-11-10

## 2025-05-14 ASSESSMENT — PATIENT HEALTH QUESTIONNAIRE - PHQ9
2. FEELING DOWN, DEPRESSED OR HOPELESS: NOT AT ALL
SUM OF ALL RESPONSES TO PHQ9 QUESTIONS 1 AND 2: 0
1. LITTLE INTEREST OR PLEASURE IN DOING THINGS: NOT AT ALL

## 2025-05-14 ASSESSMENT — ENCOUNTER SYMPTOMS
NUMBNESS: 1
ARTHRALGIAS: 1
NECK STIFFNESS: 1

## 2025-05-14 NOTE — PROGRESS NOTES
"Subjective   Patient ID: Marisabel Segundo is a 35 y.o. female who presents for Numbness and Annual Exam.    She complains of a few months of pain along the tendon from her right thumb down through her wrist.  She also complains of pain in the left palmar tendon area.  She denies any injury to either of these places.  No swelling.  Minimal tenderness.  She also complains of a hot and numb tingling feeling through her spine only when she sits back on something hot like her hot car seat.  There is no peripheral shooting or radiation.  It is transient in nature and self resolved resolved within minutes  Finally she also mentions a tightness through her right trapezius muscle.  No radicular symptoms associated with this.    Review of Systems   Musculoskeletal:  Positive for arthralgias and neck stiffness.   Neurological:  Positive for numbness.       Objective   /60 (BP Location: Left arm, Patient Position: Sitting, BP Cuff Size: Adult)   Pulse 75   Temp 36.4 °C (97.5 °F) (Tympanic)   Resp 14   Ht 1.676 m (5' 6\")   Wt 64 kg (141 lb)   LMP  (LMP Unknown) Comment: breast feeding  SpO2 99%   Breastfeeding Yes   BMI 22.76 kg/m²     Physical Exam  Vitals reviewed.   Constitutional:       Appearance: Normal appearance.   HENT:      Head: Normocephalic.   Cardiovascular:      Rate and Rhythm: Normal rate.   Pulmonary:      Effort: Pulmonary effort is normal.   Musculoskeletal:         General: No swelling. Normal range of motion.      Comments: Mild tenderness along extensor tendon of thumb down through wrist.  No bony tenderness.  Full ROM.   Even less tenderness through laft palmar area.  No erythema.  No swelling.   Neurological:      General: No focal deficit present.      Mental Status: She is alert.   Psychiatric:         Mood and Affect: Mood normal.         Assessment/Plan   Problem List Items Addressed This Visit    None  Visit Diagnoses         Codes      Tendonitis    -  Primary M77.9    Relevant Medications "    predniSONE (Deltasone) 10 mg tablet      Dry skin dermatitis     L85.3      Muscle tension pain     M79.10      Screening for hyperlipidemia     Z13.220    Relevant Orders    Lipid Panel      Periodic health assessment, general screening, adult     Z00.00    Relevant Orders    CBC    Comprehensive Metabolic Panel    Thyroid Stimulating Hormone    Vitamin D 25-Hydroxy,Total (for eval of Vitamin D levels)        We discussed the above.  There is some tendinitis along the extensor tendon of her right thumb and possibly of the left flexor tendons of her left hand.  No swelling warmth or erythema.  Will treat this with some prednisone as well as some topical Voltaren gel.  We also discussed her trapezius muscle tension in her right side.  Prednisone should help this as well.  We also discussed massage heat and stretching.  We discussed this funny sensation along her back when pressed against something hot.  It is transient no radicular symptoms and no symptoms in between episodes.  At this point time we will simply monitor this and no testing is currently warranted.  Will see her back in a couple months to see how things are going with the above.  Will order labs ahead of time for a physical.  She should return sooner if any issues or changes

## 2025-07-11 ENCOUNTER — APPOINTMENT (OUTPATIENT)
Dept: PRIMARY CARE | Facility: CLINIC | Age: 35
End: 2025-07-11
Payer: COMMERCIAL

## 2025-07-18 ENCOUNTER — APPOINTMENT (OUTPATIENT)
Dept: PRIMARY CARE | Facility: CLINIC | Age: 35
End: 2025-07-18
Payer: COMMERCIAL

## 2025-07-18 VITALS
WEIGHT: 147 LBS | RESPIRATION RATE: 14 BRPM | BODY MASS INDEX: 23.63 KG/M2 | TEMPERATURE: 96 F | SYSTOLIC BLOOD PRESSURE: 124 MMHG | HEART RATE: 72 BPM | DIASTOLIC BLOOD PRESSURE: 70 MMHG | HEIGHT: 66 IN | OXYGEN SATURATION: 98 %

## 2025-07-18 DIAGNOSIS — E28.2 PCOS (POLYCYSTIC OVARIAN SYNDROME): ICD-10-CM

## 2025-07-18 DIAGNOSIS — M19.90 ARTHRITIS: Primary | ICD-10-CM

## 2025-07-18 DIAGNOSIS — K64.9 HEMORRHOIDS, UNSPECIFIED HEMORRHOID TYPE: ICD-10-CM

## 2025-07-18 DIAGNOSIS — B18.1 HEPATITIS B CARRIER (MULTI): ICD-10-CM

## 2025-07-18 DIAGNOSIS — M25.50 ARTHRALGIA, UNSPECIFIED JOINT: ICD-10-CM

## 2025-07-18 PROCEDURE — 99213 OFFICE O/P EST LOW 20 MIN: CPT | Performed by: INTERNAL MEDICINE

## 2025-07-18 PROCEDURE — 3008F BODY MASS INDEX DOCD: CPT | Performed by: INTERNAL MEDICINE

## 2025-07-18 RX ORDER — METFORMIN HYDROCHLORIDE 500 MG/1
1000 TABLET, EXTENDED RELEASE ORAL
Qty: 200 TABLET | Refills: 3 | Status: SHIPPED | OUTPATIENT
Start: 2025-07-18 | End: 2026-08-22

## 2025-07-18 ASSESSMENT — PATIENT HEALTH QUESTIONNAIRE - PHQ9
2. FEELING DOWN, DEPRESSED OR HOPELESS: NOT AT ALL
1. LITTLE INTEREST OR PLEASURE IN DOING THINGS: NOT AT ALL
SUM OF ALL RESPONSES TO PHQ9 QUESTIONS 1 AND 2: 0

## 2025-07-18 ASSESSMENT — ENCOUNTER SYMPTOMS
NAUSEA: 0
SHORTNESS OF BREATH: 0
PALPITATIONS: 0
CONSTIPATION: 0
VOMITING: 0
COUGH: 0
ABDOMINAL PAIN: 0
ARTHRALGIAS: 1

## 2025-07-18 NOTE — PROGRESS NOTES
"Subjective   Patient ID: Marisabel Segundo is a 35 y.o. female who presents for 2 month follow up.    Here for follow-up on her wrist and hand pain.  She was having symptoms consistent with a perhaps combined arthritis and tendinitis.  She was treated with prednisone and had substantial improvement.  She Diltz still does have some discomfort along her tendon sheaths around her wrist.  No joint swelling.  No warmth or erythema.  We also discussed her history of PCOS and difficulty getting pregnant possibly because of this.  She also is hoping to get checked for hepatitis as she knows she is a hepatitis carrier from possibly being exposed as a young young child presumably from hospitalization    Review of Systems   Respiratory:  Negative for cough and shortness of breath.    Cardiovascular:  Negative for chest pain and palpitations.   Gastrointestinal:  Negative for abdominal pain, constipation, nausea and vomiting.   Musculoskeletal:  Positive for arthralgias.        Wrist pain / bi-lat       Objective   /70 (BP Location: Left arm, Patient Position: Sitting, BP Cuff Size: Adult)   Pulse 72   Temp 35.6 °C (96 °F) (Tympanic)   Resp 14   Ht 1.676 m (5' 6\")   Wt 66.7 kg (147 lb)   SpO2 98%   Breastfeeding Yes   BMI 23.73 kg/m²     Physical Exam  Vitals reviewed.   Constitutional:       Appearance: Normal appearance.   HENT:      Head: Normocephalic.     Cardiovascular:      Rate and Rhythm: Normal rate.   Pulmonary:      Effort: Pulmonary effort is normal.     Musculoskeletal:         General: Tenderness present. No swelling. Normal range of motion.      Comments: Some tenderness of her right wrist primarily around and along her tendon sheaths     Neurological:      General: No focal deficit present.      Mental Status: She is alert.     Psychiatric:         Mood and Affect: Mood normal.         Assessment/Plan   Problem List Items Addressed This Visit    None  Visit Diagnoses         Codes      Arthritis    -  " Primary M19.90      Arthralgia, unspecified joint     M25.50    Relevant Orders    Citrulline Antibody, IgG    Rheumatoid Factor    LENI with Reflex to NOEL    Uric Acid    Sedimentation Rate      PCOS (polycystic ovarian syndrome)     E28.2    Relevant Medications    metFORMIN XR (Glucophage-XR) 500 mg 24 hr tablet      Hemorrhoids, unspecified hemorrhoid type     K64.9      Hepatitis B carrier (Multi)     B18.1    Relevant Orders    Hepatitis B surface antibody    Hepatitis B core antibody, total    Hepatitis B surface antigen    Hepatitis B DNA, Ultraquantitative, PCR    Referral to Hepatology          Answered.  Will see her back in a couple months, sooner if any issues or concerns

## 2025-07-24 DIAGNOSIS — R39.9 UTI SYMPTOMS: Primary | ICD-10-CM

## 2025-07-24 RX ORDER — CEPHALEXIN 500 MG/1
500 CAPSULE ORAL 3 TIMES DAILY
Qty: 9 CAPSULE | Refills: 0 | Status: SHIPPED | OUTPATIENT
Start: 2025-07-24 | End: 2025-07-27

## 2025-08-03 LAB
25(OH)D3+25(OH)D2 SERPL-MCNC: 56 NG/ML (ref 30–100)
ALBUMIN SERPL-MCNC: 4.7 G/DL (ref 3.6–5.1)
ALP SERPL-CCNC: 93 U/L (ref 31–125)
ALT SERPL-CCNC: 13 U/L (ref 6–29)
ANA SER QL IF: NORMAL
ANION GAP SERPL CALCULATED.4IONS-SCNC: 15 MMOL/L (CALC) (ref 7–17)
APPEARANCE UR: ABNORMAL
AST SERPL-CCNC: 14 U/L (ref 10–30)
BACTERIA #/AREA URNS HPF: ABNORMAL /HPF
BACTERIA UR CULT: ABNORMAL
BACTERIA UR CULT: ABNORMAL
BILIRUB SERPL-MCNC: 0.4 MG/DL (ref 0.2–1.2)
BILIRUB UR QL STRIP: NEGATIVE
BUN SERPL-MCNC: 14 MG/DL (ref 7–25)
CALCIUM SERPL-MCNC: 10.2 MG/DL (ref 8.6–10.2)
CCP IGG SERPL-ACNC: NORMAL
CHLORIDE SERPL-SCNC: 102 MMOL/L (ref 98–110)
CHOLEST SERPL-MCNC: 186 MG/DL
CHOLEST/HDLC SERPL: 4.1 (CALC)
CO2 SERPL-SCNC: 22 MMOL/L (ref 20–32)
COLOR UR: YELLOW
CREAT SERPL-MCNC: 0.75 MG/DL (ref 0.5–0.97)
EGFRCR SERPLBLD CKD-EPI 2021: 106 ML/MIN/1.73M2
ERYTHROCYTE [DISTWIDTH] IN BLOOD BY AUTOMATED COUNT: 12.3 % (ref 11–15)
ERYTHROCYTE [SEDIMENTATION RATE] IN BLOOD BY WESTERGREN METHOD: 14 MM/H
GLUCOSE SERPL-MCNC: 95 MG/DL (ref 65–99)
GLUCOSE UR QL STRIP: NEGATIVE
HBV CORE AB SERPL QL IA: NORMAL
HBV DNA SERPL NAA+PROBE-ACNC: NORMAL [IU]/ML
HBV DNA SERPL NAA+PROBE-LOG IU: NORMAL {LOG_IU}/ML
HBV SURFACE AB SERPL IA-ACNC: NORMAL M[IU]/ML
HBV SURFACE AG SERPL QL IA: NORMAL
HBV SURFACE AG SERPL QL NT: NORMAL
HCT VFR BLD AUTO: 42.1 % (ref 35–45)
HDLC SERPL-MCNC: 45 MG/DL
HGB BLD-MCNC: 13.9 G/DL (ref 11.7–15.5)
HGB UR QL STRIP: ABNORMAL
HYALINE CASTS #/AREA URNS LPF: ABNORMAL /LPF
KETONES UR QL STRIP: NEGATIVE
LDLC SERPL CALC-MCNC: 109 MG/DL (CALC)
LEUKOCYTE ESTERASE UR QL STRIP: ABNORMAL
MCH RBC QN AUTO: 29.1 PG (ref 27–33)
MCHC RBC AUTO-ENTMCNC: 33 G/DL (ref 32–36)
MCV RBC AUTO: 88.3 FL (ref 80–100)
NITRITE UR QL STRIP: POSITIVE
NONHDLC SERPL-MCNC: 141 MG/DL (CALC)
PH UR STRIP: 6 [PH] (ref 5–8)
PLATELET # BLD AUTO: 245 THOUSAND/UL (ref 140–400)
PMV BLD REES-ECKER: 9.4 FL (ref 7.5–12.5)
POTASSIUM SERPL-SCNC: 4.9 MMOL/L (ref 3.5–5.3)
PROT SERPL-MCNC: 7.7 G/DL (ref 6.1–8.1)
PROT UR QL STRIP: ABNORMAL
RBC # BLD AUTO: 4.77 MILLION/UL (ref 3.8–5.1)
RBC #/AREA URNS HPF: ABNORMAL /HPF
RHEUMATOID FACT SERPL-ACNC: NORMAL [IU]/ML
SERVICE CMNT-IMP: ABNORMAL
SODIUM SERPL-SCNC: 139 MMOL/L (ref 135–146)
SP GR UR STRIP: 1.02 (ref 1–1.03)
SQUAMOUS #/AREA URNS HPF: ABNORMAL /HPF
TRIGL SERPL-MCNC: 204 MG/DL
TSH SERPL-ACNC: 2.93 MIU/L
URATE SERPL-MCNC: 6.8 MG/DL (ref 2.5–7)
WBC # BLD AUTO: 9.4 THOUSAND/UL (ref 3.8–10.8)
WBC #/AREA URNS HPF: ABNORMAL /HPF

## 2025-08-04 DIAGNOSIS — N30.00 ACUTE CYSTITIS WITHOUT HEMATURIA: Primary | ICD-10-CM

## 2025-08-04 LAB
APPEARANCE UR: ABNORMAL
BACTERIA #/AREA URNS HPF: ABNORMAL /HPF
BACTERIA UR CULT: ABNORMAL
BACTERIA UR CULT: ABNORMAL
BILIRUB UR QL STRIP: NEGATIVE
COLOR UR: YELLOW
GLUCOSE UR QL STRIP: NEGATIVE
HGB UR QL STRIP: ABNORMAL
HYALINE CASTS #/AREA URNS LPF: ABNORMAL /LPF
KETONES UR QL STRIP: NEGATIVE
LEUKOCYTE ESTERASE UR QL STRIP: ABNORMAL
NITRITE UR QL STRIP: POSITIVE
PH UR STRIP: 6 [PH] (ref 5–8)
PROT UR QL STRIP: ABNORMAL
RBC #/AREA URNS HPF: ABNORMAL /HPF
SERVICE CMNT-IMP: ABNORMAL
SP GR UR STRIP: 1.02 (ref 1–1.03)
SQUAMOUS #/AREA URNS HPF: ABNORMAL /HPF
WBC #/AREA URNS HPF: ABNORMAL /HPF

## 2025-08-04 RX ORDER — SULFAMETHOXAZOLE AND TRIMETHOPRIM 800; 160 MG/1; MG/1
1 TABLET ORAL 2 TIMES DAILY
Qty: 10 TABLET | Refills: 0 | Status: SHIPPED | OUTPATIENT
Start: 2025-08-04 | End: 2025-08-09

## 2025-08-05 LAB
ANA PAT SER IF-IMP: ABNORMAL
ANA SER QL IF: POSITIVE
ANA TITR SER IF: ABNORMAL TITER
CCP IGG SERPL-ACNC: <16 UNITS
CENTROMERE B AB SER-ACNC: ABNORMAL AI
DSDNA AB SER-ACNC: <1 IU/ML
ENA JO1 AB SER IA-ACNC: ABNORMAL AI
ENA RNP AB SER-ACNC: ABNORMAL AI
ENA SCL70 AB SER IA-ACNC: ABNORMAL AI
ENA SM AB SER IA-ACNC: ABNORMAL AI
ENA SM+RNP AB SER IA-ACNC: ABNORMAL AI
ENA SS-A AB SER IA-ACNC: ABNORMAL AI
ENA SS-B AB SER IA-ACNC: ABNORMAL AI
ERYTHROCYTE [SEDIMENTATION RATE] IN BLOOD BY WESTERGREN METHOD: 14 MM/H
HBV CORE AB SERPL QL IA: REACTIVE
HBV DNA SERPL NAA+PROBE-ACNC: NOT DETECTED IU/ML
HBV DNA SERPL NAA+PROBE-LOG IU: NOT DETECTED LOG IU/ML
HBV SURFACE AB SERPL IA-ACNC: 13 MIU/ML
HBV SURFACE AG SERPL QL IA: NORMAL
LABORATORY COMMENT REPORT: ABNORMAL
NUCLEOSOME AB SER IA-ACNC: ABNORMAL AI
RHEUMATOID FACT SERPL-ACNC: <10 IU/ML
RIBOSOMAL P AB SER-ACNC: ABNORMAL AI
URATE SERPL-MCNC: 6.8 MG/DL (ref 2.5–7)

## 2025-08-28 ENCOUNTER — TELEMEDICINE (OUTPATIENT)
Dept: ENDOCRINOLOGY | Facility: CLINIC | Age: 35
End: 2025-08-28
Payer: COMMERCIAL

## 2025-08-28 DIAGNOSIS — Z31.41 FERTILITY TESTING: ICD-10-CM

## 2025-08-28 DIAGNOSIS — Z01.83 ENCOUNTER FOR RH BLOOD TYPING: ICD-10-CM

## 2025-08-28 DIAGNOSIS — Z11.3 SCREENING FOR STDS (SEXUALLY TRANSMITTED DISEASES): ICD-10-CM

## 2025-08-28 DIAGNOSIS — N97.9 FEMALE INFERTILITY: ICD-10-CM

## 2025-08-28 DIAGNOSIS — Z13.1 SCREENING FOR DIABETES MELLITUS: ICD-10-CM

## 2025-08-28 DIAGNOSIS — Z01.818 PRE-PROCEDURAL EXAMINATION: ICD-10-CM

## 2025-08-28 DIAGNOSIS — E28.2 POLYCYSTIC OVARIAN SYNDROME: Primary | ICD-10-CM

## 2025-08-28 PROCEDURE — 99215 OFFICE O/P EST HI 40 MIN: CPT | Performed by: OBSTETRICS & GYNECOLOGY

## 2025-08-28 RX ORDER — MEDROXYPROGESTERONE ACETATE 10 MG/1
10 TABLET ORAL DAILY
Qty: 10 TABLET | Refills: 0 | Status: SHIPPED | OUTPATIENT
Start: 2025-08-28 | End: 2025-09-07

## 2025-12-17 ENCOUNTER — APPOINTMENT (OUTPATIENT)
Dept: PRIMARY CARE | Facility: CLINIC | Age: 35
End: 2025-12-17
Payer: COMMERCIAL

## (undated) DEVICE — SUTURE, VICRYL 0, 36 IN, CT-1, VIOLET

## (undated) DEVICE — GLOVE, SURGICAL, PROTEXIS PI MICRO, 6.0, PF, LF

## (undated) DEVICE — SUTURE, VICRYL, 0, 36 IN, CT, UNDYED

## (undated) DEVICE — ELECTRODE, ELECTROSURGICAL, UTAHLOOP, CUTTING, SAFE-T-GAUGE, 11 CM SHAFT, 12 X 20 MM, LF

## (undated) DEVICE — DRAPE PACK, CESAREAN SECTION, CUSTOM, UHC

## (undated) DEVICE — GLOVE, SURGICAL, PROTEXIS PI BLUE W/NEUTHERA, 6.0, PF, LF

## (undated) DEVICE — SUTURE, MONOCRYL, 2-0, 27 IN, SH/V-20 , UNDYED

## (undated) DEVICE — TOWEL PACK, STERILE, 4/PACK, BLUE